# Patient Record
Sex: FEMALE | Race: WHITE | NOT HISPANIC OR LATINO | Employment: OTHER | URBAN - NONMETROPOLITAN AREA
[De-identification: names, ages, dates, MRNs, and addresses within clinical notes are randomized per-mention and may not be internally consistent; named-entity substitution may affect disease eponyms.]

---

## 2023-04-10 ENCOUNTER — TELEPHONE (OUTPATIENT)
Dept: PRIMARY CARE | Facility: CLINIC | Age: 88
End: 2023-04-10
Payer: MEDICARE

## 2023-04-10 DIAGNOSIS — I10 PRIMARY HYPERTENSION: ICD-10-CM

## 2023-04-10 DIAGNOSIS — M19.90 OSTEOARTHRITIS, UNSPECIFIED OSTEOARTHRITIS TYPE, UNSPECIFIED SITE: ICD-10-CM

## 2023-04-10 DIAGNOSIS — E03.9 ACQUIRED HYPOTHYROIDISM: ICD-10-CM

## 2023-04-10 DIAGNOSIS — E78.2 MIXED HYPERLIPIDEMIA: Primary | ICD-10-CM

## 2023-04-10 RX ORDER — ATORVASTATIN CALCIUM 20 MG/1
1 TABLET, FILM COATED ORAL DAILY
COMMUNITY
Start: 2020-02-25 | End: 2023-04-10 | Stop reason: SDUPTHER

## 2023-04-10 RX ORDER — MELOXICAM 7.5 MG/1
1 TABLET ORAL DAILY
COMMUNITY
Start: 2020-08-17 | End: 2023-04-10 | Stop reason: SDUPTHER

## 2023-04-10 RX ORDER — METOPROLOL SUCCINATE 50 MG/1
50 TABLET, EXTENDED RELEASE ORAL DAILY
Qty: 90 TABLET | Refills: 3 | Status: SHIPPED | OUTPATIENT
Start: 2023-04-10 | End: 2023-10-16 | Stop reason: SDUPTHER

## 2023-04-10 RX ORDER — ATORVASTATIN CALCIUM 20 MG/1
20 TABLET, FILM COATED ORAL DAILY
Qty: 90 TABLET | Refills: 3 | Status: SHIPPED | OUTPATIENT
Start: 2023-04-10 | End: 2024-02-28 | Stop reason: SDUPTHER

## 2023-04-10 RX ORDER — POTASSIUM CHLORIDE 750 MG/1
1 TABLET, FILM COATED, EXTENDED RELEASE ORAL 2 TIMES DAILY
COMMUNITY
Start: 2020-02-25 | End: 2023-04-10 | Stop reason: SDUPTHER

## 2023-04-10 RX ORDER — MELOXICAM 7.5 MG/1
7.5 TABLET ORAL DAILY
Qty: 90 TABLET | Refills: 3 | Status: SHIPPED | OUTPATIENT
Start: 2023-04-10 | End: 2024-02-28 | Stop reason: SDUPTHER

## 2023-04-10 RX ORDER — FELODIPINE 10 MG/1
1 TABLET, EXTENDED RELEASE ORAL DAILY
COMMUNITY
Start: 2020-02-26 | End: 2023-04-10 | Stop reason: SDUPTHER

## 2023-04-10 RX ORDER — METOPROLOL SUCCINATE 50 MG/1
1 TABLET, EXTENDED RELEASE ORAL DAILY
COMMUNITY
Start: 2020-02-25 | End: 2023-04-10 | Stop reason: SDUPTHER

## 2023-04-10 RX ORDER — LEVOTHYROXINE SODIUM 50 UG/1
1 TABLET ORAL DAILY
COMMUNITY
Start: 2020-02-25 | End: 2023-04-10 | Stop reason: SDUPTHER

## 2023-04-10 RX ORDER — FELODIPINE 10 MG/1
10 TABLET, EXTENDED RELEASE ORAL DAILY
Qty: 90 TABLET | Refills: 3 | Status: SHIPPED | OUTPATIENT
Start: 2023-04-10 | End: 2024-02-28 | Stop reason: SDUPTHER

## 2023-04-10 RX ORDER — LOSARTAN POTASSIUM AND HYDROCHLOROTHIAZIDE 12.5; 1 MG/1; MG/1
1 TABLET ORAL DAILY
COMMUNITY
Start: 2020-02-25 | End: 2023-04-10 | Stop reason: SDUPTHER

## 2023-04-10 RX ORDER — LOSARTAN POTASSIUM AND HYDROCHLOROTHIAZIDE 12.5; 1 MG/1; MG/1
1 TABLET ORAL DAILY
Qty: 90 TABLET | Refills: 3 | Status: SHIPPED | OUTPATIENT
Start: 2023-04-10 | End: 2024-02-28 | Stop reason: SDUPTHER

## 2023-04-10 RX ORDER — POTASSIUM CHLORIDE 750 MG/1
10 TABLET, FILM COATED, EXTENDED RELEASE ORAL 2 TIMES DAILY
Qty: 180 TABLET | Refills: 3 | Status: SHIPPED | OUTPATIENT
Start: 2023-04-10 | End: 2023-10-26 | Stop reason: SDUPTHER

## 2023-04-10 RX ORDER — LEVOTHYROXINE SODIUM 50 UG/1
50 TABLET ORAL DAILY
Qty: 90 TABLET | Refills: 3 | Status: SHIPPED | OUTPATIENT
Start: 2023-04-10 | End: 2024-02-28 | Stop reason: SDUPTHER

## 2023-04-11 LAB
ALANINE AMINOTRANSFERASE (SGPT) (U/L) IN SER/PLAS: 11 U/L (ref 7–45)
ALBUMIN (G/DL) IN SER/PLAS: 4.2 G/DL (ref 3.4–5)
ALKALINE PHOSPHATASE (U/L) IN SER/PLAS: 87 U/L (ref 33–136)
ANION GAP IN SER/PLAS: 11 MMOL/L (ref 10–20)
ASPARTATE AMINOTRANSFERASE (SGOT) (U/L) IN SER/PLAS: 19 U/L (ref 9–39)
BASOPHILS (10*3/UL) IN BLOOD BY AUTOMATED COUNT: 0.03 X10E9/L (ref 0–0.1)
BASOPHILS/100 LEUKOCYTES IN BLOOD BY AUTOMATED COUNT: 0.6 % (ref 0–2)
BILIRUBIN TOTAL (MG/DL) IN SER/PLAS: 0.8 MG/DL (ref 0–1.2)
CALCIUM (MG/DL) IN SER/PLAS: 9.5 MG/DL (ref 8.6–10.3)
CARBON DIOXIDE, TOTAL (MMOL/L) IN SER/PLAS: 28 MMOL/L (ref 21–32)
CHLORIDE (MMOL/L) IN SER/PLAS: 104 MMOL/L (ref 98–107)
CHOLESTEROL (MG/DL) IN SER/PLAS: 199 MG/DL (ref 0–199)
CHOLESTEROL IN HDL (MG/DL) IN SER/PLAS: 81 MG/DL
CHOLESTEROL/HDL RATIO: 2.5
COBALAMIN (VITAMIN B12) (PG/ML) IN SER/PLAS: 344 PG/ML (ref 211–911)
CREATININE (MG/DL) IN SER/PLAS: 1.34 MG/DL (ref 0.5–1.05)
EOSINOPHILS (10*3/UL) IN BLOOD BY AUTOMATED COUNT: 0.1 X10E9/L (ref 0–0.4)
EOSINOPHILS/100 LEUKOCYTES IN BLOOD BY AUTOMATED COUNT: 1.9 % (ref 0–6)
ERYTHROCYTE DISTRIBUTION WIDTH (RATIO) BY AUTOMATED COUNT: 13 % (ref 11.5–14.5)
ERYTHROCYTE MEAN CORPUSCULAR HEMOGLOBIN CONCENTRATION (G/DL) BY AUTOMATED: 31.6 G/DL (ref 32–36)
ERYTHROCYTE MEAN CORPUSCULAR VOLUME (FL) BY AUTOMATED COUNT: 97 FL (ref 80–100)
ERYTHROCYTES (10*6/UL) IN BLOOD BY AUTOMATED COUNT: 4.39 X10E12/L (ref 4–5.2)
GFR FEMALE: 36 ML/MIN/1.73M2
GLUCOSE (MG/DL) IN SER/PLAS: 89 MG/DL (ref 74–99)
HEMATOCRIT (%) IN BLOOD BY AUTOMATED COUNT: 42.4 % (ref 36–46)
HEMOGLOBIN (G/DL) IN BLOOD: 13.4 G/DL (ref 12–16)
IMMATURE GRANULOCYTES/100 LEUKOCYTES IN BLOOD BY AUTOMATED COUNT: 0.4 % (ref 0–0.9)
LDL: 94 MG/DL (ref 0–99)
LEUKOCYTES (10*3/UL) IN BLOOD BY AUTOMATED COUNT: 5.2 X10E9/L (ref 4.4–11.3)
LYMPHOCYTES (10*3/UL) IN BLOOD BY AUTOMATED COUNT: 2.47 X10E9/L (ref 0.8–3)
LYMPHOCYTES/100 LEUKOCYTES IN BLOOD BY AUTOMATED COUNT: 47.5 % (ref 13–44)
MONOCYTES (10*3/UL) IN BLOOD BY AUTOMATED COUNT: 0.49 X10E9/L (ref 0.05–0.8)
MONOCYTES/100 LEUKOCYTES IN BLOOD BY AUTOMATED COUNT: 9.4 % (ref 2–10)
NEUTROPHILS (10*3/UL) IN BLOOD BY AUTOMATED COUNT: 2.09 X10E9/L (ref 1.6–5.5)
NEUTROPHILS/100 LEUKOCYTES IN BLOOD BY AUTOMATED COUNT: 40.2 % (ref 40–80)
PLATELETS (10*3/UL) IN BLOOD AUTOMATED COUNT: 183 X10E9/L (ref 150–450)
POTASSIUM (MMOL/L) IN SER/PLAS: 3.9 MMOL/L (ref 3.5–5.3)
PROTEIN TOTAL: 6.3 G/DL (ref 6.4–8.2)
SODIUM (MMOL/L) IN SER/PLAS: 139 MMOL/L (ref 136–145)
THYROTROPIN (MIU/L) IN SER/PLAS BY DETECTION LIMIT <= 0.05 MIU/L: 3.67 MIU/L (ref 0.44–3.98)
TRIGLYCERIDE (MG/DL) IN SER/PLAS: 122 MG/DL (ref 0–149)
UREA NITROGEN (MG/DL) IN SER/PLAS: 29 MG/DL (ref 6–23)
VLDL: 24 MG/DL (ref 0–40)

## 2023-04-17 PROBLEM — R35.1 NOCTURIA: Status: ACTIVE | Noted: 2023-04-17

## 2023-04-17 PROBLEM — R20.0 NUMBNESS AND TINGLING IN LEFT HAND: Status: ACTIVE | Noted: 2023-04-17

## 2023-04-17 PROBLEM — L82.1 SEBORRHEIC KERATOSIS: Status: ACTIVE | Noted: 2023-04-17

## 2023-04-17 PROBLEM — N20.0 KIDNEY STONE: Status: ACTIVE | Noted: 2023-04-17

## 2023-04-17 PROBLEM — G56.00 CARPAL TUNNEL SYNDROME: Status: ACTIVE | Noted: 2023-04-17

## 2023-04-17 PROBLEM — N39.0 RECURRENT UTI: Status: ACTIVE | Noted: 2023-04-17

## 2023-04-17 PROBLEM — E78.5 HYPERLIPEMIA: Status: ACTIVE | Noted: 2023-04-17

## 2023-04-17 PROBLEM — R21 SKIN RASH: Status: ACTIVE | Noted: 2023-04-17

## 2023-04-17 PROBLEM — N18.30 CHRONIC RENAL IMPAIRMENT, STAGE 3 (MODERATE) (MULTI): Status: ACTIVE | Noted: 2023-04-17

## 2023-04-17 PROBLEM — K62.3 RECTAL PROLAPSE: Status: ACTIVE | Noted: 2023-04-17

## 2023-04-17 PROBLEM — M13.0 POLYARTHRITIS: Status: ACTIVE | Noted: 2023-04-17

## 2023-04-17 PROBLEM — R41.0 CONFUSION: Status: ACTIVE | Noted: 2023-04-17

## 2023-04-17 PROBLEM — K59.00 CONSTIPATION: Status: ACTIVE | Noted: 2023-04-17

## 2023-04-17 PROBLEM — E53.8 VITAMIN B12 DEFICIENCY: Status: ACTIVE | Noted: 2023-04-17

## 2023-04-17 PROBLEM — M54.2 NECK PAIN: Status: ACTIVE | Noted: 2023-04-17

## 2023-04-17 PROBLEM — M79.10 MYALGIA: Status: ACTIVE | Noted: 2023-04-17

## 2023-04-17 PROBLEM — I10 HYPERTENSION: Status: ACTIVE | Noted: 2023-04-17

## 2023-04-17 PROBLEM — E03.9 HYPOTHYROID: Status: ACTIVE | Noted: 2023-04-17

## 2023-04-17 PROBLEM — S60.00XA CONTUSION OF FINGER, LEFT: Status: ACTIVE | Noted: 2023-04-17

## 2023-04-17 PROBLEM — M79.645 PAIN OF LEFT MIDDLE FINGER: Status: ACTIVE | Noted: 2023-04-17

## 2023-04-17 PROBLEM — K64.4 HEMORRHOIDS, EXTERNAL: Status: ACTIVE | Noted: 2023-04-17

## 2023-04-17 PROBLEM — Z86.010 HISTORY OF COLON POLYPS: Status: ACTIVE | Noted: 2023-04-17

## 2023-04-17 PROBLEM — R53.83 FATIGUE: Status: ACTIVE | Noted: 2023-04-17

## 2023-04-17 PROBLEM — N39.3 SUI (STRESS URINARY INCONTINENCE, FEMALE): Status: ACTIVE | Noted: 2023-04-17

## 2023-04-17 PROBLEM — K21.9 GERD (GASTROESOPHAGEAL REFLUX DISEASE): Status: ACTIVE | Noted: 2023-04-17

## 2023-04-17 PROBLEM — R82.90 ABNORMAL URINE ODOR: Status: RESOLVED | Noted: 2023-04-17 | Resolved: 2023-04-17

## 2023-04-17 PROBLEM — R20.2 NUMBNESS AND TINGLING IN LEFT HAND: Status: ACTIVE | Noted: 2023-04-17

## 2023-04-17 PROBLEM — K58.9 IBS (IRRITABLE BOWEL SYNDROME): Status: ACTIVE | Noted: 2023-04-17

## 2023-04-17 PROBLEM — K62.5 RECTAL BLEEDING: Status: ACTIVE | Noted: 2023-04-17

## 2023-04-17 PROBLEM — R35.0 URINE FREQUENCY: Status: ACTIVE | Noted: 2023-04-17

## 2023-04-17 PROBLEM — F32.A DEPRESSION: Status: ACTIVE | Noted: 2023-04-17

## 2023-04-17 PROBLEM — Z86.0100 HISTORY OF COLON POLYPS: Status: ACTIVE | Noted: 2023-04-17

## 2023-04-17 PROBLEM — R30.0 DYSURIA: Status: RESOLVED | Noted: 2023-04-17 | Resolved: 2023-04-17

## 2023-04-17 RX ORDER — LUTEIN 6 MG
1 TABLET ORAL DAILY
COMMUNITY
Start: 2021-06-25

## 2023-04-17 RX ORDER — TALC
1 POWDER (GRAM) TOPICAL DAILY
COMMUNITY

## 2023-04-17 RX ORDER — FLUTICASONE PROPIONATE 50 MCG
1 SPRAY, SUSPENSION (ML) NASAL DAILY
COMMUNITY
Start: 2020-02-25 | End: 2024-02-28 | Stop reason: SDUPTHER

## 2023-04-17 RX ORDER — ACETAMINOPHEN 500 MG
1 TABLET ORAL DAILY
COMMUNITY
Start: 2021-06-25

## 2023-04-17 RX ORDER — NITROFURANTOIN 25; 75 MG/1; MG/1
1 CAPSULE ORAL 2 TIMES DAILY
COMMUNITY
Start: 2022-07-29 | End: 2023-04-18 | Stop reason: ALTCHOICE

## 2023-04-18 ENCOUNTER — OFFICE VISIT (OUTPATIENT)
Dept: PRIMARY CARE | Facility: CLINIC | Age: 88
End: 2023-04-18
Payer: MEDICARE

## 2023-04-18 VITALS
HEART RATE: 59 BPM | DIASTOLIC BLOOD PRESSURE: 52 MMHG | SYSTOLIC BLOOD PRESSURE: 140 MMHG | WEIGHT: 131 LBS | OXYGEN SATURATION: 97 % | BODY MASS INDEX: 26.41 KG/M2 | HEIGHT: 59 IN

## 2023-04-18 DIAGNOSIS — E78.2 MIXED HYPERLIPIDEMIA: ICD-10-CM

## 2023-04-18 DIAGNOSIS — N18.32 CHRONIC RENAL IMPAIRMENT, STAGE 3B (MULTI): ICD-10-CM

## 2023-04-18 DIAGNOSIS — I10 PRIMARY HYPERTENSION: Primary | ICD-10-CM

## 2023-04-18 DIAGNOSIS — E03.9 ACQUIRED HYPOTHYROIDISM: ICD-10-CM

## 2023-04-18 DIAGNOSIS — K58.9 IRRITABLE BOWEL SYNDROME, UNSPECIFIED TYPE: ICD-10-CM

## 2023-04-18 DIAGNOSIS — E53.8 VITAMIN B12 DEFICIENCY: ICD-10-CM

## 2023-04-18 DIAGNOSIS — K21.9 GASTROESOPHAGEAL REFLUX DISEASE WITHOUT ESOPHAGITIS: ICD-10-CM

## 2023-04-18 PROCEDURE — 1159F MED LIST DOCD IN RCRD: CPT | Performed by: FAMILY MEDICINE

## 2023-04-18 PROCEDURE — 3078F DIAST BP <80 MM HG: CPT | Performed by: FAMILY MEDICINE

## 2023-04-18 PROCEDURE — 1036F TOBACCO NON-USER: CPT | Performed by: FAMILY MEDICINE

## 2023-04-18 PROCEDURE — 99213 OFFICE O/P EST LOW 20 MIN: CPT | Performed by: FAMILY MEDICINE

## 2023-04-18 PROCEDURE — 3077F SYST BP >= 140 MM HG: CPT | Performed by: FAMILY MEDICINE

## 2023-04-18 PROCEDURE — 1160F RVW MEDS BY RX/DR IN RCRD: CPT | Performed by: FAMILY MEDICINE

## 2023-04-18 ASSESSMENT — ENCOUNTER SYMPTOMS
ABDOMINAL PAIN: 0
DIFFICULTY URINATING: 0
RHINORRHEA: 0
ABDOMINAL DISTENTION: 0
PALPITATIONS: 0
FATIGUE: 0
SHORTNESS OF BREATH: 0
UNEXPECTED WEIGHT CHANGE: 0
TROUBLE SWALLOWING: 0
VOMITING: 0
HEADACHES: 0
LIGHT-HEADEDNESS: 0
WHEEZING: 0
APPETITE CHANGE: 0
ARTHRALGIAS: 1
COUGH: 0
ADENOPATHY: 0
JOINT SWELLING: 1
DIARRHEA: 0
NUMBNESS: 0
NAUSEA: 0
CONSTIPATION: 1
DIZZINESS: 0
ACTIVITY CHANGE: 0

## 2023-04-18 NOTE — PROGRESS NOTES
"Subjective   Patient ID: Josi Mcneill is a 89 y.o. female who presents for 6 MO F/U HTN LABS.    HPI   No headache, chest pain, shortness of breath, dizziness (if lays back), lightheadedness, or edema  The patient is taking thyroid medications as directed without problems, labs done in the last year, no symptoms of excessive fatigue, edema or weight gain.  Having some pain in left arm and hand, went to ER, seen orthopedics, sent to OT, weakness with grabbing things at times  No falls, not sleeping well at night  Some issues with bowels  Some RN at times    Review of Systems   Constitutional:  Negative for activity change, appetite change, fatigue and unexpected weight change.   HENT:  Negative for ear pain, nosebleeds, rhinorrhea, sneezing and trouble swallowing.    Respiratory:  Negative for cough, shortness of breath and wheezing.    Cardiovascular:  Negative for chest pain, palpitations and leg swelling.   Gastrointestinal:  Positive for constipation. Negative for abdominal distention, abdominal pain, diarrhea, nausea and vomiting.   Genitourinary:  Negative for difficulty urinating.   Musculoskeletal:  Positive for arthralgias and joint swelling.   Skin:  Negative for rash.   Neurological:  Negative for dizziness, light-headedness, numbness and headaches.   Hematological:  Negative for adenopathy.   Psychiatric/Behavioral:  Negative for behavioral problems.    All other systems reviewed and are negative.      Objective   /52   Pulse 59   Ht 1.499 m (4' 11\")   Wt 59.4 kg (131 lb)   SpO2 97%   BMI 26.46 kg/m²     Physical Exam  Vitals and nursing note reviewed.   Constitutional:       Appearance: Normal appearance.   Cardiovascular:      Rate and Rhythm: Normal rate and regular rhythm.      Pulses: Normal pulses.      Heart sounds: Normal heart sounds.      Comments: +2 ankle swelling.  Pulmonary:      Effort: Pulmonary effort is normal.      Breath sounds: Normal breath sounds.   Neurological:    "   Mental Status: She is alert.   Psychiatric:         Mood and Affect: Mood normal.         Behavior: Behavior normal.         Assessment/Plan   Problem List Items Addressed This Visit          Circulatory    Hypertension - Primary     Blood pressure under good control, renal function stable, no change with medication.         Relevant Orders    Comprehensive Metabolic Panel    Follow Up In Primary Care       Digestive    GERD (gastroesophageal reflux disease)     Seems stable with current medication.         Relevant Orders    Follow Up In Primary Care    IBS (irritable bowel syndrome)     Advised about over-the-counter supplemental fiber.         Relevant Orders    Follow Up In Primary Care       Genitourinary    Chronic renal impairment, stage 3 (moderate) (CMS/HCC)     Creatinine is stable, electrolytes are stable, hemoglobin also stable, no change with medication.         Relevant Orders    CBC    Comprehensive Metabolic Panel    Follow Up In Primary Care       Endocrine/Metabolic    Hypothyroid     Thyroid testing stable, no change.         Relevant Orders    Follow Up In Primary Care    Vitamin B12 deficiency     Laboratory testing stable no change.         Relevant Orders    Follow Up In Primary Care       Other    Hyperlipemia    Relevant Orders    Cholesterol, LDL Direct    Follow Up In Primary Care

## 2023-07-13 ENCOUNTER — HOSPITAL ENCOUNTER (OUTPATIENT)
Dept: DATA CONVERSION | Facility: HOSPITAL | Age: 88
End: 2023-07-13
Attending: NURSE PRACTITIONER

## 2023-07-17 ENCOUNTER — OFFICE VISIT (OUTPATIENT)
Dept: PRIMARY CARE | Facility: CLINIC | Age: 88
End: 2023-07-17
Payer: MEDICARE

## 2023-07-17 VITALS
HEIGHT: 59 IN | HEART RATE: 60 BPM | DIASTOLIC BLOOD PRESSURE: 60 MMHG | OXYGEN SATURATION: 96 % | WEIGHT: 131.5 LBS | SYSTOLIC BLOOD PRESSURE: 160 MMHG | BODY MASS INDEX: 26.51 KG/M2

## 2023-07-17 DIAGNOSIS — R41.0 CONFUSION: ICD-10-CM

## 2023-07-17 DIAGNOSIS — I10 PRIMARY HYPERTENSION: Primary | ICD-10-CM

## 2023-07-17 DIAGNOSIS — M13.0 POLYARTHRITIS: ICD-10-CM

## 2023-07-17 DIAGNOSIS — N39.0 RECURRENT UTI: ICD-10-CM

## 2023-07-17 PROCEDURE — 3077F SYST BP >= 140 MM HG: CPT | Performed by: FAMILY MEDICINE

## 2023-07-17 PROCEDURE — 1159F MED LIST DOCD IN RCRD: CPT | Performed by: FAMILY MEDICINE

## 2023-07-17 PROCEDURE — 1036F TOBACCO NON-USER: CPT | Performed by: FAMILY MEDICINE

## 2023-07-17 PROCEDURE — 99213 OFFICE O/P EST LOW 20 MIN: CPT | Performed by: FAMILY MEDICINE

## 2023-07-17 PROCEDURE — 1160F RVW MEDS BY RX/DR IN RCRD: CPT | Performed by: FAMILY MEDICINE

## 2023-07-17 PROCEDURE — 3078F DIAST BP <80 MM HG: CPT | Performed by: FAMILY MEDICINE

## 2023-07-17 ASSESSMENT — ENCOUNTER SYMPTOMS
ADENOPATHY: 0
ABDOMINAL PAIN: 0
ACTIVITY CHANGE: 0
SHORTNESS OF BREATH: 0
COUGH: 0
UNEXPECTED WEIGHT CHANGE: 0
DIARRHEA: 0
CONSTIPATION: 0
WHEEZING: 0
FATIGUE: 0
HEADACHES: 0
VOMITING: 0
RHINORRHEA: 0
ABDOMINAL DISTENTION: 0
DIFFICULTY URINATING: 0
PALPITATIONS: 0
NAUSEA: 0
DIZZINESS: 0
ARTHRALGIAS: 0
TROUBLE SWALLOWING: 0
APPETITE CHANGE: 0
NUMBNESS: 0
LIGHT-HEADEDNESS: 0

## 2023-07-17 NOTE — ASSESSMENT & PLAN NOTE
Back pain seems to have improved, venous Doppler in the emergency room of the right leg was also negative, patient able to walk and ambulate without assistance at home.

## 2023-07-17 NOTE — ASSESSMENT & PLAN NOTE
Urine culture in the emergency room on the 13th was eventually negative, patient has no further new symptoms of urinary complaints, confusion has improved.

## 2023-07-17 NOTE — ASSESSMENT & PLAN NOTE
Blood pressure slightly elevated today in the office, advised to check home blood pressure readings, no change with medication, creatinine in the emergency room on the 13th was normal.

## 2023-07-17 NOTE — PROGRESS NOTES
"Subjective   Patient ID: Josi Mcneill is a 89 y.o. female who presents for ER F/U Back Pain,Confusion.    HPI   Was in ER 7/13 with confusion and back pain, labs OK, urine culture normal, venous duplex normal  No headache, chest pain, shortness of breath, dizziness, lightheadedness, or edema  Upset easily    Review of Systems   Constitutional:  Negative for activity change, appetite change, fatigue and unexpected weight change.   HENT:  Negative for ear pain, nosebleeds, rhinorrhea, sneezing and trouble swallowing.    Respiratory:  Negative for cough, shortness of breath and wheezing.    Cardiovascular:  Negative for chest pain, palpitations and leg swelling.   Gastrointestinal:  Negative for abdominal distention, abdominal pain, constipation, diarrhea, nausea and vomiting.   Genitourinary:  Negative for difficulty urinating.   Musculoskeletal:  Negative for arthralgias.   Skin:  Negative for rash.   Neurological:  Negative for dizziness, light-headedness, numbness and headaches.   Hematological:  Negative for adenopathy.   Psychiatric/Behavioral:  Negative for behavioral problems.    All other systems reviewed and are negative.      Objective   /60   Pulse 60   Ht 1.499 m (4' 11\")   Wt 59.6 kg (131 lb 8 oz)   SpO2 96%   BMI 26.56 kg/m²     Physical Exam  Vitals and nursing note reviewed.   Constitutional:       Appearance: Normal appearance.   HENT:      Head: Normocephalic and atraumatic.      Right Ear: Tympanic membrane, ear canal and external ear normal.      Left Ear: Tympanic membrane, ear canal and external ear normal.      Nose: Nose normal.      Mouth/Throat:      Mouth: Mucous membranes are moist.      Pharynx: Oropharynx is clear.   Cardiovascular:      Rate and Rhythm: Normal rate and regular rhythm.      Pulses: Normal pulses.      Heart sounds: Normal heart sounds.   Pulmonary:      Effort: Pulmonary effort is normal.      Breath sounds: Normal breath sounds.   Musculoskeletal:      " Cervical back: Normal range of motion and neck supple.   Neurological:      Mental Status: She is alert.   Psychiatric:         Mood and Affect: Mood normal.         Behavior: Behavior normal.         Assessment/Plan   Problem List Items Addressed This Visit       Confusion     Seems to have cleared for now.         Hypertension - Primary     Blood pressure slightly elevated today in the office, advised to check home blood pressure readings, no change with medication, creatinine in the emergency room on the 13th was normal.         Polyarthritis     Back pain seems to have improved, venous Doppler in the emergency room of the right leg was also negative, patient able to walk and ambulate without assistance at home.         Recurrent UTI     Urine culture in the emergency room on the 13th was eventually negative, patient has no further new symptoms of urinary complaints, confusion has improved.

## 2023-10-13 ENCOUNTER — TELEPHONE (OUTPATIENT)
Dept: PRIMARY CARE | Facility: CLINIC | Age: 88
End: 2023-10-13
Payer: MEDICARE

## 2023-10-13 NOTE — TELEPHONE ENCOUNTER
"DAUGHTER CALLED TO REPORT PATIENT IS \"OFF BALANCE\" NOT ASSOCIATED WITH POSITION CHANGES. DENIES ANY \"SPINNING\" SENSATION. SHE RELATES THIS TO THE BLOOD PRESSURE MEDICATION SHE IS ON. BP THIS MORNING /70. PLEASE ADVISE.    GERMAINE: [phone]    It is a new thing or his is been going on?  She has an appointment coming up later this month, if this is new and getting worse she probably needs to be seen.  Her blood pressure reading that they had is elevated, but not critical.  If this is new and getting worse she needs to be seen, if this is a chronic issue we can wait and see her at her normal appointment, I am reluctant to increase or adjust any medications until we have seen her.         Note         You routed conversation to Luis Batres MD4 hours ago (10:37 AM)     You4 hours ago (10:37 AM)       DAUGHTER CALLED TO REPORT PATIENT IS \"OFF BALANCE\" NOT ASSOCIATED WITH POSITION CHANGES. DENIES ANY \"SPINNING\" SENSATION. SHE RELATES THIS TO THE BLOOD PRESSURE MEDICATION SHE IS ON. BP THIS MORNING /70. PLEASE ADVISE.     GERMAINE: [phone]        SCHEDULED WITH W 10/16/2023  "

## 2023-10-13 NOTE — TELEPHONE ENCOUNTER
It is a new thing or his is been going on?  She has an appointment coming up later this month, if this is new and getting worse she probably needs to be seen.  Her blood pressure reading that they had is elevated, but not critical.  If this is new and getting worse she needs to be seen, if this is a chronic issue we can wait and see her at her normal appointment, I am reluctant to increase or adjust any medications until we have seen her.

## 2023-10-16 ENCOUNTER — OFFICE VISIT (OUTPATIENT)
Dept: PRIMARY CARE | Facility: CLINIC | Age: 88
End: 2023-10-16
Payer: MEDICARE

## 2023-10-16 VITALS
DIASTOLIC BLOOD PRESSURE: 56 MMHG | HEIGHT: 59 IN | OXYGEN SATURATION: 99 % | WEIGHT: 132.1 LBS | HEART RATE: 85 BPM | BODY MASS INDEX: 26.63 KG/M2 | SYSTOLIC BLOOD PRESSURE: 120 MMHG

## 2023-10-16 DIAGNOSIS — R42 DIZZINESS: Primary | ICD-10-CM

## 2023-10-16 DIAGNOSIS — E03.9 ACQUIRED HYPOTHYROIDISM: ICD-10-CM

## 2023-10-16 DIAGNOSIS — I10 PRIMARY HYPERTENSION: ICD-10-CM

## 2023-10-16 PROCEDURE — 1036F TOBACCO NON-USER: CPT | Performed by: NURSE PRACTITIONER

## 2023-10-16 PROCEDURE — 99213 OFFICE O/P EST LOW 20 MIN: CPT | Performed by: NURSE PRACTITIONER

## 2023-10-16 PROCEDURE — 3078F DIAST BP <80 MM HG: CPT | Performed by: NURSE PRACTITIONER

## 2023-10-16 PROCEDURE — 1160F RVW MEDS BY RX/DR IN RCRD: CPT | Performed by: NURSE PRACTITIONER

## 2023-10-16 PROCEDURE — 1159F MED LIST DOCD IN RCRD: CPT | Performed by: NURSE PRACTITIONER

## 2023-10-16 PROCEDURE — 3074F SYST BP LT 130 MM HG: CPT | Performed by: NURSE PRACTITIONER

## 2023-10-16 RX ORDER — METOPROLOL SUCCINATE 50 MG/1
50 TABLET, EXTENDED RELEASE ORAL DAILY
Qty: 90 TABLET | Refills: 3 | Status: SHIPPED | OUTPATIENT
Start: 2023-10-16 | End: 2024-10-15

## 2023-10-16 ASSESSMENT — ENCOUNTER SYMPTOMS
VOMITING: 0
DIZZINESS: 1
HEADACHES: 0
WEAKNESS: 1
PALPITATIONS: 0
SHORTNESS OF BREATH: 0
NAUSEA: 0
SPEECH DIFFICULTY: 0

## 2023-10-16 ASSESSMENT — PATIENT HEALTH QUESTIONNAIRE - PHQ9
SUM OF ALL RESPONSES TO PHQ9 QUESTIONS 1 AND 2: 0
1. LITTLE INTEREST OR PLEASURE IN DOING THINGS: NOT AT ALL
1. LITTLE INTEREST OR PLEASURE IN DOING THINGS: NOT AT ALL
2. FEELING DOWN, DEPRESSED OR HOPELESS: NOT AT ALL
2. FEELING DOWN, DEPRESSED OR HOPELESS: NOT AT ALL
SUM OF ALL RESPONSES TO PHQ9 QUESTIONS 1 AND 2: 0

## 2023-10-16 NOTE — PROGRESS NOTES
"Subjective   Patient ID: Josi Mcneill is a 89 y.o. female who presents for Dizziness (Off balance x couple weeks. ).    [place]ia comes to the office, with her daughter, to discuss dizziness.  States she has been feeling dizzy over the course of the last month.  This dizziness occurs when she gets up and moves around.  She has no prodromal symptoms prior to developing dizziness.  Dizziness occurring on a daily basis and is associated with movement.  Denies headache syncopal episodes chest pain shortness of breath or palpitations.  She does endorse some shakiness when she has these dizziness episodes.  Neck has been bothering her.  Not taking a lot of fluids  Fell on Friday lost balance trying to get dressed didn't hit head nor lost consciousness   Has noticed daughter states has noticed that her medication pill bottle is missing several medications.  IO blood pressure left arm sitting 149/58  IO blood pressure left arm standing 120/68         Review of Systems   HENT:  Positive for tinnitus.    Eyes:  Negative for visual disturbance.   Respiratory:  Negative for shortness of breath.    Cardiovascular:  Negative for chest pain and palpitations.   Gastrointestinal:  Negative for nausea and vomiting.   Neurological:  Positive for dizziness and weakness. Negative for syncope, speech difficulty and headaches.       Objective   /56   Pulse 85   Ht 1.499 m (4' 11\")   Wt 59.9 kg (132 lb 1.6 oz)   SpO2 99%   BMI 26.68 kg/m²     Physical Exam  Vitals and nursing note reviewed.   Constitutional:       Appearance: Normal appearance.   HENT:      Head: Normocephalic.   Neck:      Thyroid: No thyromegaly.      Vascular: No carotid bruit.   Cardiovascular:      Rate and Rhythm: Normal rate and regular rhythm.      Heart sounds: Normal heart sounds. No murmur heard.  Pulmonary:      Effort: Pulmonary effort is normal.      Breath sounds: Normal breath sounds.   Musculoskeletal:      Right lower le+ Edema " present.      Left lower le+ Edema present.   Skin:     General: Skin is warm and dry.   Neurological:      General: No focal deficit present.      Mental Status: She is alert and oriented to person, place, and time.   Psychiatric:         Mood and Affect: Mood normal.         Thought Content: Thought content normal.         Assessment/Plan   Problem List Items Addressed This Visit             ICD-10-CM    Hypertension I10    Relevant Medications    metoprolol succinate XL (Toprol-XL) 50 mg 24 hr tablet    Hypothyroid E03.9    Relevant Orders    Thyroid Stimulating Hormone    Dizziness - Primary R42

## 2023-10-18 ENCOUNTER — APPOINTMENT (OUTPATIENT)
Dept: PRIMARY CARE | Facility: CLINIC | Age: 88
End: 2023-10-18
Payer: MEDICARE

## 2023-10-20 ENCOUNTER — LAB (OUTPATIENT)
Dept: LAB | Facility: LAB | Age: 88
End: 2023-10-20
Payer: MEDICARE

## 2023-10-20 DIAGNOSIS — N18.32 CHRONIC RENAL IMPAIRMENT, STAGE 3B (MULTI): ICD-10-CM

## 2023-10-20 DIAGNOSIS — E78.2 MIXED HYPERLIPIDEMIA: ICD-10-CM

## 2023-10-20 DIAGNOSIS — E03.9 ACQUIRED HYPOTHYROIDISM: ICD-10-CM

## 2023-10-20 DIAGNOSIS — I10 PRIMARY HYPERTENSION: ICD-10-CM

## 2023-10-20 LAB
ALBUMIN SERPL BCP-MCNC: 4.2 G/DL (ref 3.4–5)
ALP SERPL-CCNC: 84 U/L (ref 33–136)
ALT SERPL W P-5'-P-CCNC: 12 U/L (ref 7–45)
ANION GAP SERPL CALC-SCNC: 9 MMOL/L (ref 10–20)
AST SERPL W P-5'-P-CCNC: 22 U/L (ref 9–39)
BILIRUB SERPL-MCNC: 0.7 MG/DL (ref 0–1.2)
BUN SERPL-MCNC: 26 MG/DL (ref 6–23)
CALCIUM SERPL-MCNC: 9.4 MG/DL (ref 8.6–10.3)
CHLORIDE SERPL-SCNC: 105 MMOL/L (ref 98–107)
CO2 SERPL-SCNC: 29 MMOL/L (ref 21–32)
CREAT SERPL-MCNC: 1.32 MG/DL (ref 0.5–1.05)
ERYTHROCYTE [DISTWIDTH] IN BLOOD BY AUTOMATED COUNT: 12.9 % (ref 11.5–14.5)
GFR SERPL CREATININE-BSD FRML MDRD: 36 ML/MIN/1.73M*2
GLUCOSE SERPL-MCNC: 95 MG/DL (ref 74–99)
HCT VFR BLD AUTO: 40.5 % (ref 36–46)
HGB BLD-MCNC: 12.9 G/DL (ref 12–16)
LDLC SERPL DIRECT ASSAY-MCNC: 78 MG/DL (ref 0–129)
MCH RBC QN AUTO: 31.1 PG (ref 26–34)
MCHC RBC AUTO-ENTMCNC: 31.9 G/DL (ref 32–36)
MCV RBC AUTO: 98 FL (ref 80–100)
NRBC BLD-RTO: 0 /100 WBCS (ref 0–0)
PLATELET # BLD AUTO: 181 X10*3/UL (ref 150–450)
PMV BLD AUTO: 11.3 FL (ref 7.5–11.5)
POTASSIUM SERPL-SCNC: 4.4 MMOL/L (ref 3.5–5.3)
PROT SERPL-MCNC: 6.4 G/DL (ref 6.4–8.2)
RBC # BLD AUTO: 4.15 X10*6/UL (ref 4–5.2)
SODIUM SERPL-SCNC: 139 MMOL/L (ref 136–145)
TSH SERPL-ACNC: 2.23 MIU/L (ref 0.44–3.98)
WBC # BLD AUTO: 5.3 X10*3/UL (ref 4.4–11.3)

## 2023-10-20 PROCEDURE — 85027 COMPLETE CBC AUTOMATED: CPT

## 2023-10-20 PROCEDURE — 83721 ASSAY OF BLOOD LIPOPROTEIN: CPT

## 2023-10-20 PROCEDURE — 36415 COLL VENOUS BLD VENIPUNCTURE: CPT

## 2023-10-20 PROCEDURE — 80053 COMPREHEN METABOLIC PANEL: CPT

## 2023-10-20 PROCEDURE — 84443 ASSAY THYROID STIM HORMONE: CPT

## 2023-10-26 ENCOUNTER — OFFICE VISIT (OUTPATIENT)
Dept: PRIMARY CARE | Facility: CLINIC | Age: 88
End: 2023-10-26
Payer: MEDICARE

## 2023-10-26 VITALS
DIASTOLIC BLOOD PRESSURE: 60 MMHG | WEIGHT: 131.9 LBS | OXYGEN SATURATION: 100 % | HEIGHT: 59 IN | BODY MASS INDEX: 26.59 KG/M2 | HEART RATE: 58 BPM | SYSTOLIC BLOOD PRESSURE: 120 MMHG

## 2023-10-26 DIAGNOSIS — I10 PRIMARY HYPERTENSION: Primary | ICD-10-CM

## 2023-10-26 DIAGNOSIS — K58.9 IRRITABLE BOWEL SYNDROME, UNSPECIFIED TYPE: ICD-10-CM

## 2023-10-26 DIAGNOSIS — E78.2 MIXED HYPERLIPIDEMIA: ICD-10-CM

## 2023-10-26 DIAGNOSIS — Z00.00 ROUTINE GENERAL MEDICAL EXAMINATION AT HEALTH CARE FACILITY: ICD-10-CM

## 2023-10-26 DIAGNOSIS — E03.9 ACQUIRED HYPOTHYROIDISM: ICD-10-CM

## 2023-10-26 DIAGNOSIS — N18.32 CHRONIC RENAL IMPAIRMENT, STAGE 3B (MULTI): ICD-10-CM

## 2023-10-26 DIAGNOSIS — R42 VERTIGO: ICD-10-CM

## 2023-10-26 DIAGNOSIS — E53.8 VITAMIN B12 DEFICIENCY: ICD-10-CM

## 2023-10-26 DIAGNOSIS — K21.9 GASTROESOPHAGEAL REFLUX DISEASE WITHOUT ESOPHAGITIS: ICD-10-CM

## 2023-10-26 PROBLEM — R35.0 URINE FREQUENCY: Status: RESOLVED | Noted: 2023-04-17 | Resolved: 2023-10-26

## 2023-10-26 PROBLEM — R21 SKIN RASH: Status: RESOLVED | Noted: 2023-04-17 | Resolved: 2023-10-26

## 2023-10-26 PROBLEM — M54.2 NECK PAIN: Status: RESOLVED | Noted: 2023-04-17 | Resolved: 2023-10-26

## 2023-10-26 PROBLEM — R41.0 CONFUSION: Status: RESOLVED | Noted: 2023-04-17 | Resolved: 2023-10-26

## 2023-10-26 PROBLEM — M79.645 PAIN OF LEFT MIDDLE FINGER: Status: RESOLVED | Noted: 2023-04-17 | Resolved: 2023-10-26

## 2023-10-26 PROBLEM — R20.2 NUMBNESS AND TINGLING IN LEFT HAND: Status: RESOLVED | Noted: 2023-04-17 | Resolved: 2023-10-26

## 2023-10-26 PROBLEM — R35.1 NOCTURIA: Status: RESOLVED | Noted: 2023-04-17 | Resolved: 2023-10-26

## 2023-10-26 PROBLEM — S60.00XA CONTUSION OF FINGER, LEFT: Status: RESOLVED | Noted: 2023-04-17 | Resolved: 2023-10-26

## 2023-10-26 PROBLEM — R20.0 NUMBNESS AND TINGLING IN LEFT HAND: Status: RESOLVED | Noted: 2023-04-17 | Resolved: 2023-10-26

## 2023-10-26 PROCEDURE — 1160F RVW MEDS BY RX/DR IN RCRD: CPT | Performed by: FAMILY MEDICINE

## 2023-10-26 PROCEDURE — 1036F TOBACCO NON-USER: CPT | Performed by: FAMILY MEDICINE

## 2023-10-26 PROCEDURE — 99214 OFFICE O/P EST MOD 30 MIN: CPT | Performed by: FAMILY MEDICINE

## 2023-10-26 PROCEDURE — 1170F FXNL STATUS ASSESSED: CPT | Performed by: FAMILY MEDICINE

## 2023-10-26 PROCEDURE — 1159F MED LIST DOCD IN RCRD: CPT | Performed by: FAMILY MEDICINE

## 2023-10-26 PROCEDURE — 3074F SYST BP LT 130 MM HG: CPT | Performed by: FAMILY MEDICINE

## 2023-10-26 PROCEDURE — G0439 PPPS, SUBSEQ VISIT: HCPCS | Performed by: FAMILY MEDICINE

## 2023-10-26 PROCEDURE — 3078F DIAST BP <80 MM HG: CPT | Performed by: FAMILY MEDICINE

## 2023-10-26 RX ORDER — POTASSIUM CHLORIDE 750 MG/1
10 TABLET, FILM COATED, EXTENDED RELEASE ORAL 2 TIMES DAILY
Qty: 180 TABLET | Refills: 3 | Status: SHIPPED | OUTPATIENT
Start: 2023-10-26 | End: 2024-10-25

## 2023-10-26 ASSESSMENT — ENCOUNTER SYMPTOMS
LOSS OF SENSATION IN FEET: 0
DEPRESSION: 0
LIGHT-HEADEDNESS: 0
UNEXPECTED WEIGHT CHANGE: 0
FATIGUE: 0
NUMBNESS: 0
ADENOPATHY: 0
RHINORRHEA: 0
ABDOMINAL PAIN: 0
DIZZINESS: 1
DIARRHEA: 0
ARTHRALGIAS: 0
VOMITING: 0
TROUBLE SWALLOWING: 0
APPETITE CHANGE: 0
CONSTIPATION: 0
ACTIVITY CHANGE: 0
ABDOMINAL DISTENTION: 0
DIFFICULTY URINATING: 0
HEADACHES: 0
SHORTNESS OF BREATH: 0
WHEEZING: 0
PALPITATIONS: 0
COUGH: 0
NAUSEA: 0
OCCASIONAL FEELINGS OF UNSTEADINESS: 1

## 2023-10-26 ASSESSMENT — ACTIVITIES OF DAILY LIVING (ADL)
BATHING: INDEPENDENT
MANAGING_FINANCES: NEEDS ASSISTANCE
GROCERY_SHOPPING: INDEPENDENT
TAKING_MEDICATION: INDEPENDENT
DOING_HOUSEWORK: INDEPENDENT
DRESSING: INDEPENDENT

## 2023-10-26 ASSESSMENT — PATIENT HEALTH QUESTIONNAIRE - PHQ9
2. FEELING DOWN, DEPRESSED OR HOPELESS: NOT AT ALL
1. LITTLE INTEREST OR PLEASURE IN DOING THINGS: NOT AT ALL
SUM OF ALL RESPONSES TO PHQ9 QUESTIONS 1 AND 2: 0

## 2023-10-26 NOTE — PROGRESS NOTES
"Subjective   Reason for Visit: Josi Mcneill is an 89 y.o. female here for a Medicare Wellness visit.     Past Medical, Surgical, and Family History reviewed and updated in chart.    Reviewed all medications by prescribing practitioner or clinical pharmacist (such as prescriptions, OTCs, herbal therapies and supplements) and documented in the medical record.    HPI  No headache, chest pain, shortness of breath, dizziness (when lays down), lightheadedness, or edema  The patient is taking thyroid medications as directed without problems, labs done in the last year, no symptoms of excessive fatigue, edema or weight gain.  Vertigo at night when lays down, had a fall in the past month, for the past 2 weeks  HBP less than 140/90, no nausea, bowels moving  No issues with joint pain, some trouble with left ear    Patient Care Team:  Luis Batres MD as PCP - General  Luis Batres MD as PCP - [employer] [facility] Attributed Provider  Luis Batres MD as PCP - [employer] PCP     Review of Systems   Constitutional:  Negative for activity change, appetite change, fatigue and unexpected weight change.   HENT:  Negative for ear pain, nosebleeds, rhinorrhea, sneezing and trouble swallowing.    Respiratory:  Negative for cough, shortness of breath and wheezing.    Cardiovascular:  Negative for chest pain, palpitations and leg swelling.   Gastrointestinal:  Negative for abdominal distention, abdominal pain, constipation, diarrhea, nausea and vomiting.   Genitourinary:  Negative for difficulty urinating.   Musculoskeletal:  Negative for arthralgias.   Skin:  Negative for rash.   Neurological:  Positive for dizziness. Negative for light-headedness, numbness and headaches.   Hematological:  Negative for adenopathy.   Psychiatric/Behavioral:  Negative for behavioral problems.    All other systems reviewed and are negative.      Objective   Vitals:  /60   Pulse 58   Ht 1.499 m (4' 11\")   Wt 59.8 kg (131 lb 14.4 oz)   " SpO2 100%   BMI 26.64 kg/m²       Physical Exam  Vitals and nursing note reviewed.   Constitutional:       Appearance: Normal appearance.   HENT:      Head: Normocephalic and atraumatic.      Right Ear: Tympanic membrane, ear canal and external ear normal.      Left Ear: Tympanic membrane, ear canal and external ear normal.      Nose: Nose normal.      Mouth/Throat:      Mouth: Mucous membranes are moist.      Pharynx: Oropharynx is clear.   Cardiovascular:      Rate and Rhythm: Normal rate and regular rhythm.      Pulses: Normal pulses.      Heart sounds: Normal heart sounds.   Pulmonary:      Effort: Pulmonary effort is normal.      Breath sounds: Normal breath sounds.   Musculoskeletal:      Cervical back: Normal range of motion and neck supple.   Neurological:      Mental Status: She is alert.      Comments: Positive Edie-Hallpike maneuver to the left.   Psychiatric:         Mood and Affect: Mood normal.         Behavior: Behavior normal.         Assessment/Plan   Problem List Items Addressed This Visit       Chronic renal impairment, stage 3 (moderate) (CMS/HCC)    Current Assessment & Plan     Renal function stable, potassium and bicarbonate stable, no change.         Relevant Medications    potassium chloride CR 10 mEq ER tablet    Other Relevant Orders    Follow Up In Primary Care - Established    CBC and Auto Differential    Comprehensive Metabolic Panel    GERD (gastroesophageal reflux disease)    Current Assessment & Plan     No complaints with dysphagia.         Relevant Orders    Follow Up In Primary Care - Established    Hyperlipemia    Current Assessment & Plan     Labs stable no change.         Relevant Orders    Follow Up In Primary Care - Established    Comprehensive Metabolic Panel    Lipid Panel    Hypertension - Primary    Current Assessment & Plan     Blood pressure stable today, home blood pressure readings below 140/90, tolerating medication, stable renal function.         Relevant  Medications    potassium chloride CR 10 mEq ER tablet    Other Relevant Orders    Follow Up In Primary Care - Established    Comprehensive Metabolic Panel    Hypothyroid    Current Assessment & Plan     TSH therapeutic, no change.         Relevant Orders    Follow Up In Primary Care - Established    IBS (irritable bowel syndrome)    Relevant Orders    Follow Up In Primary Care - Established    Vitamin B12 deficiency    Current Assessment & Plan     Neck level at follow-up.         Relevant Orders    Follow Up In Primary Care - Established    Vitamin B12     Other Visit Diagnoses       Routine general medical examination at health care facility        Relevant Orders    Follow Up In Primary Care - Established    Vertigo        Refer to PT for vestibular training.    Relevant Orders    Follow Up In Primary Care - Established    Referral to Physical Therapy             Patient was identified as a fall risk. Risk prevention instructions provided.

## 2023-10-26 NOTE — ASSESSMENT & PLAN NOTE
Blood pressure stable today, home blood pressure readings below 140/90, tolerating medication, stable renal function.

## 2023-11-09 ENCOUNTER — EVALUATION (OUTPATIENT)
Dept: PHYSICAL THERAPY | Facility: CLINIC | Age: 88
End: 2023-11-09
Payer: MEDICARE

## 2023-11-09 DIAGNOSIS — R42 VERTIGO: ICD-10-CM

## 2023-11-09 PROCEDURE — 97112 NEUROMUSCULAR REEDUCATION: CPT | Mod: GP

## 2023-11-09 PROCEDURE — 97161 PT EVAL LOW COMPLEX 20 MIN: CPT | Mod: GP

## 2023-11-09 ASSESSMENT — PAIN - FUNCTIONAL ASSESSMENT: PAIN_FUNCTIONAL_ASSESSMENT: 0-10

## 2023-11-09 ASSESSMENT — ENCOUNTER SYMPTOMS
OCCASIONAL FEELINGS OF UNSTEADINESS: 0
LOSS OF SENSATION IN FEET: 0
DEPRESSION: 0

## 2023-11-09 ASSESSMENT — PAIN SCALES - GENERAL: PAINLEVEL_OUTOF10: 0 - NO PAIN

## 2023-11-09 NOTE — LETTER
November 9, 2023     Patient: Josi Mcneill   YOB: 1924   Date of Visit: 11/9/2023       To Whom it May Concern:    Josi Mcneill was seen in my clinic on 11/9/2023. She {Return to school/sport:96701}.    If you have any questions or concerns, please don't hesitate to call.         Sincerely,          Sadaf Holm, PT        CC: No Recipients

## 2023-11-09 NOTE — LETTER
November 9, 2023    Sadaf Holm PT  [address] [facility]  Rehab Services  [facility] [address]    Patient: Josi Mcneill   YOB: 1924   Date of Visit: 11/9/2023       Dear Luis Batres Md  [address] [place] [address]    The attached plan of care is being sent to you because your patient’s medical reimbursement requires that you certify the plan of care. Your signature is required to allow uninterrupted insurance coverage.      You may indicate your approval by signing below and faxing this form back to us at Dept Fax: [phone].    Please call Dept: [phone] with any questions or concerns.    Thank you for this referral,        Sadaf Holm PT  [facility] [address] [address] [facility] [address]    Payer: Payor: MEDICARE / Plan: MEDICARE PART A AND B / Product Type: *No Product type* /                                                                         Date:     Dear Sadaf Holm PT,     Re: Ms. Josi Mcneill, MRN:[id number]    I certify that I have reviewed the attached plan of care and it is medically necessary for Ms. Josi Mcneill (09/19/1924) who is under my care.          ______________________________________                    _________________  Provider name and credentials                                           Date and time                                                                                           Plan of Care 11/9/23   Effective from: 11/9/2023  Effective to: 12/9/2023    Plan ID: [id number]            Participants as of Finalize on 11/9/2023    Name Type Comments Contact Info    Luis Batres MD PCP - General  [phone]    Sadaf Holm PT Physical Therapist  [phone]       Last Plan Note     Author: Sadaf Holm PT Status: Incomplete Last edited: 11/9/2023  1:15 PM       Physical Therapy Evaluation and Treatment      Patient Name: Josi Mcneill  MRN: [id number]  Today's Date:  2023         Assessment:  PT Assessment Results: Decreased mobility, dizziness  Rehab Prognosis: Good  Evaluation/Treatment Tolerance:  (Tolerated well however dizziness/ imbalance increased at end)  Strengths: Capable of completing ADLs semi/independent, Insight into problems, Physical health, Support of Caregivers  Barriers to Participation:  (general mobility for completing manuevers)    Ms. Mcneill arrives to outpatient PT with s/s consistent with c/o R posterior canal BPPV which most affects her while lying in bed every night. She does demo imbalance after completing neuro re-ed. Educated on Alfonso daroff to R only and how to safely perform at home. Perform HEP 1-2x/day, 3x reps maximum. Recheck in 1-2 weeks which pt instructed to cancel if dizziness subsides by then. If she does not return within 30 days from today she will be discharged per attendance policy.       Plan:  Alfonso daroff maneuver to R. Check other canals again.     Treatment/Interventions: Canalith repositioning, Education/ Instruction, Self care/ home management, Neuromuscular re-education, Therapeutic exercises, Therapeutic activities  PT Plan: Skilled PT  PT Frequency: Follow-up visit only  Duration: in 1-2 weeks  Rehab Potential: Good  Plan of Care Agreement: Patient    Current Problem:   Problem List Items Addressed This Visit    None  Visit Diagnoses         Codes    Vertigo     R42    Refer to PT for vestibular training.     Relevant Orders    Follow Up In Physical Therapy              Subjective   General: General  Reason for Referral: dizzy  Referred By: maria esther  Past Medical History Relevant to Rehab: HTN, OA  Family/Caregiver Present: Yes (daughter)  Pt identified by name and .  Dizziness with lying down at night. Dizziness described as room spinning. No dizziness otherwise. HTN is well medicated. Has been going on for a while now. No changes in diet or activity level. No changes in hearing or ringing in ears.      Precautions:  Precautions  STEADI Fall Risk Score (The score of 4 or more indicates an increased risk of falling): 1  Precautions Comment: low fall risk    Pain:  Pain Assessment  Pain Assessment: 0-10  Pain Score: 0 - No pain    Prior Level of Function:  Prior Function Per Pt/Caregiver Report  Level of Wake: Independent with ADLs and functional transfers, Independent with homemaking with ambulation    Objective  Cervical AROM  WFL all    Significant fwd flexed head, slumped shoulders    TTP: B UT, SCM  Tightness: severe B UT, LS, MT, SCM, cervical paraspinals    Saccades-      Vertical: intact      Horizontal: intact  Smooth pursuit (H test): intact    Head shake test (VOR): intact    Vertebral artery test: negative    Edie hallpike: +R with s/s and nystagmus observed   Horizontal roll test: + R from s/s, no nystagmus    DHI: 10  /100    Outcome Measures:  Other Measures  Dizziness Handicap Inventory: 10/100     Treatments:  Balance/Neuromuscular Re-Education  Balance/Neuromuscular Re-Education Activity Performed: Yes  Epley R x1  Kaden AVILES x2    OP EDUCATION:  Education  Individual(s) Educated: Patient, Child  Education Provided: Anatomy, Home Exercise Program, Physiology, POC  Patient/Caregiver Demonstrated Understanding: yes  Plan of Care Discussed and Agreed Upon: yes  Patient Response to Education: Patient/Caregiver Verbalized Understanding of Information, Patient/Caregiver Performed Return Demonstration of Exercises/Activities    HEP HO given  Kaden AVILES    Goals:  Active       Mobility       Goal 1       Start:  11/09/23    Expected End:  12/24/23       In 2 weeks, pt will report 50% or more reduction of dizziness s/s for going to bed.          Goal 2       Start:  11/09/23    Expected End:  12/24/23       In 2 weeks, pt will be IND and compliant with HEP for participation throughout POC.                      Current Participants as of 11/9/2023    Name Type Comments Contact Info     Luis Batres MD PCP - General  [phone]    Signature pending    Sadaf Holm PT Physical Therapist  [phone]    Signature pending

## 2023-11-09 NOTE — LETTER
November 9, 2023     Patient: Josi Mcneill   YOB: 1924   Date of Visit: 11/9/2023       To Whom It May Concern:    It is my medical opinion that Josi Mcneill {Work release (duty restriction):18551}.    If you have any questions or concerns, please don't hesitate to call.         Sincerely,        Sadaf Holm, PT    CC: No Recipients

## 2023-11-09 NOTE — PROGRESS NOTES
Physical Therapy Evaluation and Treatment      Patient Name: Josi Mcneill  MRN: [id number]  Today's Date: 2023  Time Calculation  Start Time: 1316  Stop Time: 1350  Time Calculation (min): 34 min      Assessment:  PT Assessment Results: Decreased mobility, dizziness  Rehab Prognosis: Good  Evaluation/Treatment Tolerance:  (Tolerated well however dizziness/ imbalance increased at end)  Strengths: Capable of completing ADLs semi/independent, Insight into problems, Physical health, Support of Caregivers  Barriers to Participation:  (general mobility for completing manuevers)    Ms. Mcneill arrives to outpatient PT with s/s consistent with c/o R posterior canal BPPV which most affects her while lying in bed every night. She does demo imbalance after completing neuro re-ed. Educated on Alfonso daroff to R only and how to safely perform at home. Perform HEP 1-2x/day, 3x reps maximum. Recheck in 1-2 weeks which pt instructed to cancel if dizziness subsides by then. If she does not return within 30 days from today she will be discharged per attendance policy.       Plan:  Alfonso daroff maneuver to R. Check other canals again.     Treatment/Interventions: Canalith repositioning, Education/ Instruction, Self care/ home management, Neuromuscular re-education, Therapeutic exercises, Therapeutic activities  PT Plan: Skilled PT  PT Frequency: Follow-up visit only  Duration: in 1-2 weeks  Rehab Potential: Good  Plan of Care Agreement: Patient    Current Problem:   Problem List Items Addressed This Visit    None  Visit Diagnoses         Codes    Vertigo     R42    Refer to PT for vestibular training.     Relevant Orders    Follow Up In Physical Therapy              Subjective    General: General  Reason for Referral: dizzy  Referred By: maria esther  Past Medical History Relevant to Rehab: HTN, OA  Family/Caregiver Present: Yes (daughter)  Pt identified by name and .  Dizziness with lying down at night. Dizziness described as  room spinning. No dizziness otherwise. HTN is well medicated. Has been going on for a while now. No changes in diet or activity level. No changes in hearing or ringing in ears.     Precautions:  Precautions  STEADI Fall Risk Score (The score of 4 or more indicates an increased risk of falling): 1  Precautions Comment: low fall risk    Pain:  Pain Assessment  Pain Assessment: 0-10  Pain Score: 0 - No pain    Prior Level of Function:  Prior Function Per Pt/Caregiver Report  Level of [place]: Independent with ADLs and functional transfers, Independent with homemaking with ambulation    Objective   Cervical AROM  WFL all    Significant fwd flexed head, slumped shoulders    TTP: B UT, SCM  Tightness: severe B UT, LS, MT, SCM, cervical paraspinals    Saccades-      Vertical: intact      Horizontal: intact  Smooth pursuit (H test): intact    Head shake test (VOR): intact    Vertebral artery test: negative    [place] hallpike: +R with s/s and nystagmus observed   Horizontal roll test: + R from s/s, no nystagmus    DHI: 10  /100    Outcome Measures:  Other Measures  Dizziness Handicap Inventory: 10/100     Treatments:  Balance/Neuromuscular Re-Education  Balance/Neuromuscular Re-Education Activity Performed: Yes  Epley R x1  Kaden AVILES x2    OP EDUCATION:  Education  Individual(s) Educated: Patient, Child  Education Provided: Anatomy, Home Exercise Program, Physiology, POC  Patient/Caregiver Demonstrated Understanding: yes  Plan of Care Discussed and Agreed Upon: yes  Patient Response to Education: Patient/Caregiver Verbalized Understanding of Information, Patient/Caregiver Performed Return Demonstration of Exercises/Activities    HEP HO given  Kaden AVILES    Goals:  Active       Mobility       Goal 1       Start:  11/09/23    Expected End:  12/24/23       In 2 weeks, pt will report 50% or more reduction of dizziness s/s for going to bed.          Goal 2       Start:  11/09/23    Expected End:  12/24/23       In 2  weeks, pt will be IND and compliant with HEP for participation throughout POC.

## 2023-11-21 ENCOUNTER — DOCUMENTATION (OUTPATIENT)
Dept: PHYSICAL THERAPY | Facility: CLINIC | Age: 88
End: 2023-11-21
Payer: MEDICARE

## 2023-11-21 ENCOUNTER — APPOINTMENT (OUTPATIENT)
Dept: PHYSICAL THERAPY | Facility: CLINIC | Age: 88
End: 2023-11-21
Payer: MEDICARE

## 2023-11-21 NOTE — PROGRESS NOTES
Physical Therapy    Discharge Summary    Name: Josi Mcneill  MRN: [id number]  : 1924  Date: 23    Discharge Summary: PT    Discharge Information: Date of discharge 23    Pt cancelled recheck today as she is feeling better and doing well with HEP therefore PT services not required today. At this time she will be discharged from PT services.

## 2024-01-18 ENCOUNTER — APPOINTMENT (OUTPATIENT)
Dept: PRIMARY CARE | Facility: CLINIC | Age: 89
End: 2024-01-18
Payer: MEDICARE

## 2024-01-25 ENCOUNTER — OFFICE VISIT (OUTPATIENT)
Dept: PRIMARY CARE | Facility: CLINIC | Age: 89
End: 2024-01-25
Payer: MEDICARE

## 2024-01-25 VITALS
HEART RATE: 69 BPM | HEIGHT: 59 IN | DIASTOLIC BLOOD PRESSURE: 60 MMHG | SYSTOLIC BLOOD PRESSURE: 140 MMHG | BODY MASS INDEX: 26.03 KG/M2 | OXYGEN SATURATION: 96 % | WEIGHT: 129.1 LBS

## 2024-01-25 DIAGNOSIS — N18.32 CHRONIC RENAL IMPAIRMENT, STAGE 3B (MULTI): ICD-10-CM

## 2024-01-25 DIAGNOSIS — I10 PRIMARY HYPERTENSION: ICD-10-CM

## 2024-01-25 DIAGNOSIS — F03.B0 MODERATE DEMENTIA, UNSPECIFIED DEMENTIA TYPE, UNSPECIFIED WHETHER BEHAVIORAL, PSYCHOTIC, OR MOOD DISTURBANCE OR ANXIETY (MULTI): Primary | ICD-10-CM

## 2024-01-25 DIAGNOSIS — E03.9 ACQUIRED HYPOTHYROIDISM: ICD-10-CM

## 2024-01-25 DIAGNOSIS — R41.0 CONFUSION: ICD-10-CM

## 2024-01-25 LAB
ALBUMIN SERPL BCP-MCNC: 4.5 G/DL (ref 3.4–5)
ALP SERPL-CCNC: 81 U/L (ref 33–136)
ALT SERPL W P-5'-P-CCNC: 11 U/L (ref 7–45)
ANION GAP SERPL CALC-SCNC: 12 MMOL/L (ref 10–20)
AST SERPL W P-5'-P-CCNC: 20 U/L (ref 9–39)
BASOPHILS # BLD AUTO: 0.03 X10*3/UL (ref 0–0.1)
BASOPHILS NFR BLD AUTO: 0.4 %
BILIRUB SERPL-MCNC: 0.6 MG/DL (ref 0–1.2)
BUN SERPL-MCNC: 38 MG/DL (ref 6–23)
CALCIUM SERPL-MCNC: 9.3 MG/DL (ref 8.6–10.3)
CHLORIDE SERPL-SCNC: 104 MMOL/L (ref 98–107)
CO2 SERPL-SCNC: 26 MMOL/L (ref 21–32)
CREAT SERPL-MCNC: 1.56 MG/DL (ref 0.5–1.05)
EGFRCR SERPLBLD CKD-EPI 2021: 30 ML/MIN/1.73M*2
EOSINOPHIL # BLD AUTO: 0.06 X10*3/UL (ref 0–0.4)
EOSINOPHIL NFR BLD AUTO: 0.9 %
ERYTHROCYTE [DISTWIDTH] IN BLOOD BY AUTOMATED COUNT: 13.2 % (ref 11.5–14.5)
GLUCOSE SERPL-MCNC: 113 MG/DL (ref 74–99)
HCT VFR BLD AUTO: 40.7 % (ref 36–46)
HGB BLD-MCNC: 13.2 G/DL (ref 12–16)
IMM GRANULOCYTES # BLD AUTO: 0.02 X10*3/UL (ref 0–0.5)
IMM GRANULOCYTES NFR BLD AUTO: 0.3 % (ref 0–0.9)
LYMPHOCYTES # BLD AUTO: 2.01 X10*3/UL (ref 0.8–3)
LYMPHOCYTES NFR BLD AUTO: 30 %
MCH RBC QN AUTO: 31.6 PG (ref 26–34)
MCHC RBC AUTO-ENTMCNC: 32.4 G/DL (ref 32–36)
MCV RBC AUTO: 97 FL (ref 80–100)
MONOCYTES # BLD AUTO: 0.56 X10*3/UL (ref 0.05–0.8)
MONOCYTES NFR BLD AUTO: 8.3 %
NEUTROPHILS # BLD AUTO: 4.03 X10*3/UL (ref 1.6–5.5)
NEUTROPHILS NFR BLD AUTO: 60.1 %
NRBC BLD-RTO: 0 /100 WBCS (ref 0–0)
PLATELET # BLD AUTO: 157 X10*3/UL (ref 150–450)
POC APPEARANCE, URINE: CLEAR
POC BILIRUBIN, URINE: NEGATIVE
POC BLOOD, URINE: NEGATIVE
POC COLOR, URINE: YELLOW
POC GLUCOSE, URINE: NEGATIVE MG/DL
POC KETONES, URINE: NEGATIVE MG/DL
POC LEUKOCYTES, URINE: ABNORMAL
POC NITRITE,URINE: NEGATIVE
POC PH, URINE: 6.5 PH
POC PROTEIN, URINE: NEGATIVE MG/DL
POC SPECIFIC GRAVITY, URINE: 1.01
POC UROBILINOGEN, URINE: 0.2 EU/DL
POTASSIUM SERPL-SCNC: 4.1 MMOL/L (ref 3.5–5.3)
PROT SERPL-MCNC: 6.6 G/DL (ref 6.4–8.2)
RBC # BLD AUTO: 4.18 X10*6/UL (ref 4–5.2)
SODIUM SERPL-SCNC: 138 MMOL/L (ref 136–145)
TSH SERPL-ACNC: 1.03 MIU/L (ref 0.44–3.98)
VIT B12 SERPL-MCNC: 436 PG/ML (ref 211–911)
WBC # BLD AUTO: 6.7 X10*3/UL (ref 4.4–11.3)

## 2024-01-25 PROCEDURE — 81002 URINALYSIS NONAUTO W/O SCOPE: CPT | Performed by: FAMILY MEDICINE

## 2024-01-25 PROCEDURE — 1126F AMNT PAIN NOTED NONE PRSNT: CPT | Performed by: FAMILY MEDICINE

## 2024-01-25 PROCEDURE — 85025 COMPLETE CBC W/AUTO DIFF WBC: CPT

## 2024-01-25 PROCEDURE — 1036F TOBACCO NON-USER: CPT | Performed by: FAMILY MEDICINE

## 2024-01-25 PROCEDURE — 82607 VITAMIN B-12: CPT

## 2024-01-25 PROCEDURE — 1159F MED LIST DOCD IN RCRD: CPT | Performed by: FAMILY MEDICINE

## 2024-01-25 PROCEDURE — 3077F SYST BP >= 140 MM HG: CPT | Performed by: FAMILY MEDICINE

## 2024-01-25 PROCEDURE — 99214 OFFICE O/P EST MOD 30 MIN: CPT | Performed by: FAMILY MEDICINE

## 2024-01-25 PROCEDURE — 3078F DIAST BP <80 MM HG: CPT | Performed by: FAMILY MEDICINE

## 2024-01-25 PROCEDURE — 36415 COLL VENOUS BLD VENIPUNCTURE: CPT

## 2024-01-25 PROCEDURE — 1160F RVW MEDS BY RX/DR IN RCRD: CPT | Performed by: FAMILY MEDICINE

## 2024-01-25 PROCEDURE — 84443 ASSAY THYROID STIM HORMONE: CPT

## 2024-01-25 PROCEDURE — 80053 COMPREHEN METABOLIC PANEL: CPT

## 2024-01-25 ASSESSMENT — ENCOUNTER SYMPTOMS
FATIGUE: 0
VOMITING: 0
DYSPHORIC MOOD: 0
DIZZINESS: 0
RHINORRHEA: 0
CONSTIPATION: 0
LIGHT-HEADEDNESS: 0
TROUBLE SWALLOWING: 0
WHEEZING: 0
ABDOMINAL PAIN: 0
ABDOMINAL DISTENTION: 0
CONFUSION: 1
SLEEP DISTURBANCE: 0
APPETITE CHANGE: 0
ACTIVITY CHANGE: 0
COUGH: 0
NERVOUS/ANXIOUS: 0
HEADACHES: 0
PALPITATIONS: 0
ARTHRALGIAS: 0
NUMBNESS: 0
DECREASED CONCENTRATION: 1
DIARRHEA: 0
AGITATION: 0
SHORTNESS OF BREATH: 0
ADENOPATHY: 0
UNEXPECTED WEIGHT CHANGE: 0
NAUSEA: 0
DIFFICULTY URINATING: 0

## 2024-01-25 NOTE — PROGRESS NOTES
"Subjective   Patient ID: Josi Mcneill is a 89 y.o. female who presents for memory issues.    HPI   Some change in urination, anxious, neck pain, more nervous, some forgetfulness, off and on  No change in the past 2 weeks, comes and goes  Lives alone, family helps, able to do own ADLs, family manages finances (oversees)  Uses Tylenol PM at night  Restlessness comes and goes  No falls in the past 3 months     Review of Systems   Constitutional:  Negative for activity change, appetite change, fatigue and unexpected weight change.   HENT:  Negative for congestion, ear pain, nosebleeds, postnasal drip, rhinorrhea, sneezing and trouble swallowing.    Respiratory:  Negative for cough, shortness of breath and wheezing.    Cardiovascular:  Negative for chest pain, palpitations and leg swelling.   Gastrointestinal:  Negative for abdominal distention, abdominal pain, constipation, diarrhea, nausea and vomiting.   Genitourinary:  Negative for difficulty urinating.   Musculoskeletal:  Negative for arthralgias and gait problem.   Skin:  Negative for rash.   Neurological:  Negative for dizziness, light-headedness, numbness and headaches.   Hematological:  Negative for adenopathy.   Psychiatric/Behavioral:  Positive for confusion and decreased concentration. Negative for agitation, behavioral problems, dysphoric mood and sleep disturbance. The patient is not nervous/anxious.    All other systems reviewed and are negative.      Objective   /60   Pulse 69   Ht 1.499 m (4' 11\")   Wt 58.6 kg (129 lb 1.6 oz)   SpO2 96%   BMI 26.08 kg/m²     Physical Exam  Vitals and nursing note reviewed.   Constitutional:       Appearance: Normal appearance.   HENT:      Head: Normocephalic and atraumatic.      Right Ear: Tympanic membrane, ear canal and external ear normal.      Left Ear: Tympanic membrane, ear canal and external ear normal.      Nose: Nose normal.      Mouth/Throat:      Mouth: Mucous membranes are moist.      " Pharynx: Oropharynx is clear.   Cardiovascular:      Rate and Rhythm: Normal rate and regular rhythm.      Pulses: Normal pulses.      Heart sounds: Normal heart sounds.   Pulmonary:      Effort: Pulmonary effort is normal.      Breath sounds: Normal breath sounds.   Musculoskeletal:      Cervical back: Normal range of motion and neck supple.   Neurological:      General: No focal deficit present.      Mental Status: She is alert and oriented to person, place, and time. Mental status is at baseline.      Cranial Nerves: No cranial nerve deficit.      Sensory: No sensory deficit.      Motor: No weakness.      Coordination: Coordination normal.      Gait: Gait normal.      Deep Tendon Reflexes: Reflexes normal.      Comments: SLUMS 12 out of 30.  Suggestive of dementia.   Psychiatric:         Mood and Affect: Mood normal.         Behavior: Behavior normal.         Assessment/Plan   Problem List Items Addressed This Visit             ICD-10-CM    Chronic renal impairment, stage 3 (moderate) (CMS/HCC) N18.30    Relevant Orders    CBC and Auto Differential (Completed)    Comprehensive Metabolic Panel    Follow Up In Primary Care - Established    Hypertension I10    Relevant Orders    Comprehensive Metabolic Panel    Follow Up In Primary Care - Established    Hypothyroid E03.9    Relevant Orders    TSH with reflex to Free T4 if abnormal    Follow Up In Primary Care - Established    Moderate dementia (CMS/HCC) - Primary F03.B0     Patient with CT of the head done in the emergency room in 2021 that demonstrated microvascular changes to the brain.  Cognitive testing in the office today suggest a degree of dementia.  Patient struggled with short-term memory, clock drawing and understanding number sequences.  Family has noted decrease ability to remember things, some change in mood at times but patient still lives independently and able to manage her own activities of daily living with some supervision.  Will start by checking  blood tests and a urinalysis to rule out reversible causes of memory loss, plan to recheck again in 1 month, at that point consider initiating donepezil, she does not currently seem to be exhibiting signs or symptoms of mood issues but may benefit from addition of a small dose of an SSRI.         Relevant Orders    Vitamin B12    TSH with reflex to Free T4 if abnormal    POCT UA (nonautomated) manually resulted (Completed)    Follow Up In Primary Care - Established     Other Visit Diagnoses         Codes    Confusion     R41.0    Check labs and urinalysis to rule out reversible causes.     Relevant Orders    CBC and Auto Differential (Completed)    Comprehensive Metabolic Panel    Vitamin B12    POCT UA (nonautomated) manually resulted (Completed)    Follow Up In Primary Care - Established             Patient was identified as a fall risk. Risk prevention instructions provided.

## 2024-01-25 NOTE — ASSESSMENT & PLAN NOTE
Patient with CT of the head done in the emergency room in 2021 that demonstrated microvascular changes to the brain.  Cognitive testing in the office today suggest a degree of dementia.  Patient struggled with short-term memory, clock drawing and understanding number sequences.  Family has noted decrease ability to remember things, some change in mood at times but patient still lives independently and able to manage her own activities of daily living with some supervision.  Will start by checking blood tests and a urinalysis to rule out reversible causes of memory loss, plan to recheck again in 1 month, at that point consider initiating donepezil, she does not currently seem to be exhibiting signs or symptoms of mood issues but may benefit from addition of a small dose of an SSRI.

## 2024-01-31 ENCOUNTER — TELEPHONE (OUTPATIENT)
Dept: PRIMARY CARE | Facility: CLINIC | Age: 89
End: 2024-01-31
Payer: MEDICARE

## 2024-02-28 ENCOUNTER — OFFICE VISIT (OUTPATIENT)
Dept: PRIMARY CARE | Facility: CLINIC | Age: 89
End: 2024-02-28
Payer: MEDICARE

## 2024-02-28 VITALS
OXYGEN SATURATION: 97 % | HEART RATE: 58 BPM | WEIGHT: 127 LBS | SYSTOLIC BLOOD PRESSURE: 140 MMHG | BODY MASS INDEX: 25.6 KG/M2 | DIASTOLIC BLOOD PRESSURE: 50 MMHG | HEIGHT: 59 IN

## 2024-02-28 DIAGNOSIS — F03.B0 MODERATE DEMENTIA, UNSPECIFIED DEMENTIA TYPE, UNSPECIFIED WHETHER BEHAVIORAL, PSYCHOTIC, OR MOOD DISTURBANCE OR ANXIETY (MULTI): Primary | ICD-10-CM

## 2024-02-28 DIAGNOSIS — M19.90 OSTEOARTHRITIS, UNSPECIFIED OSTEOARTHRITIS TYPE, UNSPECIFIED SITE: ICD-10-CM

## 2024-02-28 DIAGNOSIS — I10 PRIMARY HYPERTENSION: ICD-10-CM

## 2024-02-28 DIAGNOSIS — E78.2 MIXED HYPERLIPIDEMIA: ICD-10-CM

## 2024-02-28 DIAGNOSIS — R41.0 CONFUSION: ICD-10-CM

## 2024-02-28 DIAGNOSIS — E03.9 ACQUIRED HYPOTHYROIDISM: ICD-10-CM

## 2024-02-28 DIAGNOSIS — J31.0 CHRONIC RHINITIS: ICD-10-CM

## 2024-02-28 PROCEDURE — 1126F AMNT PAIN NOTED NONE PRSNT: CPT | Performed by: FAMILY MEDICINE

## 2024-02-28 PROCEDURE — 1160F RVW MEDS BY RX/DR IN RCRD: CPT | Performed by: FAMILY MEDICINE

## 2024-02-28 PROCEDURE — 3078F DIAST BP <80 MM HG: CPT | Performed by: FAMILY MEDICINE

## 2024-02-28 PROCEDURE — 1036F TOBACCO NON-USER: CPT | Performed by: FAMILY MEDICINE

## 2024-02-28 PROCEDURE — 1159F MED LIST DOCD IN RCRD: CPT | Performed by: FAMILY MEDICINE

## 2024-02-28 PROCEDURE — 3077F SYST BP >= 140 MM HG: CPT | Performed by: FAMILY MEDICINE

## 2024-02-28 PROCEDURE — 99213 OFFICE O/P EST LOW 20 MIN: CPT | Performed by: FAMILY MEDICINE

## 2024-02-28 RX ORDER — FELODIPINE 10 MG/1
10 TABLET, EXTENDED RELEASE ORAL DAILY
Qty: 90 TABLET | Refills: 3 | Status: SHIPPED | OUTPATIENT
Start: 2024-02-28 | End: 2025-02-27

## 2024-02-28 RX ORDER — FLUTICASONE PROPIONATE 50 MCG
1 SPRAY, SUSPENSION (ML) NASAL DAILY
Qty: 16 G | Refills: 3 | Status: SHIPPED | OUTPATIENT
Start: 2024-02-28 | End: 2025-02-27

## 2024-02-28 RX ORDER — MELOXICAM 7.5 MG/1
7.5 TABLET ORAL DAILY
Qty: 90 TABLET | Refills: 3 | Status: SHIPPED | OUTPATIENT
Start: 2024-02-28 | End: 2025-02-27

## 2024-02-28 RX ORDER — DONEPEZIL HYDROCHLORIDE 5 MG/1
5 TABLET, FILM COATED ORAL NIGHTLY
Qty: 30 TABLET | Refills: 5 | Status: SHIPPED | OUTPATIENT
Start: 2024-02-28 | End: 2024-04-30 | Stop reason: SDUPTHER

## 2024-02-28 RX ORDER — LOSARTAN POTASSIUM AND HYDROCHLOROTHIAZIDE 12.5; 1 MG/1; MG/1
1 TABLET ORAL DAILY
Qty: 90 TABLET | Refills: 3 | Status: SHIPPED | OUTPATIENT
Start: 2024-02-28 | End: 2025-02-27

## 2024-02-28 RX ORDER — ATORVASTATIN CALCIUM 20 MG/1
20 TABLET, FILM COATED ORAL DAILY
Qty: 90 TABLET | Refills: 3 | Status: SHIPPED | OUTPATIENT
Start: 2024-02-28 | End: 2025-02-27

## 2024-02-28 RX ORDER — LEVOTHYROXINE SODIUM 50 UG/1
50 TABLET ORAL DAILY
Qty: 90 TABLET | Refills: 3 | Status: SHIPPED | OUTPATIENT
Start: 2024-02-28 | End: 2025-02-27

## 2024-02-28 ASSESSMENT — ENCOUNTER SYMPTOMS
ABDOMINAL PAIN: 0
DYSPHORIC MOOD: 0
CHEST TIGHTNESS: 0
DIARRHEA: 0
CONSTIPATION: 0
AGITATION: 0
APPETITE CHANGE: 0
PALPITATIONS: 0
NUMBNESS: 0
DECREASED CONCENTRATION: 1
NAUSEA: 0
CONFUSION: 1
ACTIVITY CHANGE: 0
DIZZINESS: 0
WEAKNESS: 0
VOMITING: 0
SLEEP DISTURBANCE: 0
SHORTNESS OF BREATH: 0
COUGH: 0
FATIGUE: 0
NERVOUS/ANXIOUS: 0
HEADACHES: 0

## 2024-02-28 NOTE — ASSESSMENT & PLAN NOTE
Patient education information provided for medication and expected management of dementia, will start donepezil 5 mg once a day, increase to 10 mg at follow-up appointment if tolerated, contact the office if any side effects.  Laboratory testing was reviewed with the patient is all within reasonable limits, his CAT scan in the past year demonstrated microvascular issues, neurocognitive testing done at last office visit does suggest dementia.  Patient and family advised about expected course of dementia issues, encouraged to watch for issues related to safety and will plan to recheck again in April.

## 2024-02-28 NOTE — PROGRESS NOTES
"Subjective   Patient ID: Josi Mcneill is a 89 y.o. female who presents for 1 MO CK.    HPI   Patient still living independently, family very attentive, power of  held by family to oversee financial issues, weight seems to be stable, had 1 fall since last seen when trying to get up to go to the toilet at night.    Review of Systems   Constitutional:  Negative for activity change, appetite change and fatigue.   Respiratory:  Negative for cough, chest tightness and shortness of breath.    Cardiovascular:  Negative for chest pain, palpitations and leg swelling.   Gastrointestinal:  Negative for abdominal pain, constipation, diarrhea, nausea and vomiting.   Neurological:  Negative for dizziness, weakness, numbness and headaches.   Psychiatric/Behavioral:  Positive for confusion and decreased concentration. Negative for agitation, dysphoric mood and sleep disturbance. The patient is not nervous/anxious.        Objective   /50   Pulse 58   Ht 1.499 m (4' 11\")   Wt 57.6 kg (127 lb)   SpO2 97%   BMI 25.65 kg/m²     Physical Exam  Vitals and nursing note reviewed.   Constitutional:       Appearance: Normal appearance.   HENT:      Head: Normocephalic and atraumatic.      Right Ear: Tympanic membrane, ear canal and external ear normal.      Left Ear: Tympanic membrane, ear canal and external ear normal.      Nose: Nose normal.      Mouth/Throat:      Mouth: Mucous membranes are moist.      Pharynx: Oropharynx is clear.   Cardiovascular:      Rate and Rhythm: Normal rate and regular rhythm.      Pulses: Normal pulses.      Heart sounds: Normal heart sounds.   Pulmonary:      Effort: Pulmonary effort is normal.      Breath sounds: Normal breath sounds.   Musculoskeletal:      Cervical back: Normal range of motion and neck supple.   Neurological:      Mental Status: She is alert.   Psychiatric:         Mood and Affect: Mood normal.         Behavior: Behavior normal.         Assessment/Plan   Problem List " Items Addressed This Visit             ICD-10-CM    Hyperlipemia E78.5    Relevant Medications    atorvastatin (Lipitor) 20 mg tablet    Hypertension I10    Relevant Medications    losartan-hydrochlorothiazide (Hyzaar) 100-12.5 mg tablet    felodipine ER (Plendil) 10 mg 24 hr tablet    Hypothyroid E03.9    Relevant Medications    levothyroxine (Synthroid, Levoxyl) 50 mcg tablet    Moderate dementia (CMS/HCC) - Primary F03.B0     Patient education information provided for medication and expected management of dementia, will start donepezil 5 mg once a day, increase to 10 mg at follow-up appointment if tolerated, contact the office if any side effects.  Laboratory testing was reviewed with the patient is all within reasonable limits, his CAT scan in the past year demonstrated microvascular issues, neurocognitive testing done at last office visit does suggest dementia.  Patient and family advised about expected course of dementia issues, encouraged to watch for issues related to safety and will plan to recheck again in April.         Relevant Medications    donepezil (Aricept) 5 mg tablet     Other Visit Diagnoses         Codes    Confusion     R41.0    Check labs and urinalysis to rule out reversible causes.     Osteoarthritis, unspecified osteoarthritis type, unspecified site     M19.90    Relevant Medications    meloxicam (Mobic) 7.5 mg tablet    Chronic rhinitis     J31.0    Relevant Medications    fluticasone (Flonase) 50 mcg/actuation nasal spray

## 2024-04-30 ENCOUNTER — OFFICE VISIT (OUTPATIENT)
Dept: PRIMARY CARE | Facility: CLINIC | Age: 89
End: 2024-04-30
Payer: MEDICARE

## 2024-04-30 VITALS
DIASTOLIC BLOOD PRESSURE: 60 MMHG | BODY MASS INDEX: 25.91 KG/M2 | HEIGHT: 59 IN | SYSTOLIC BLOOD PRESSURE: 124 MMHG | OXYGEN SATURATION: 95 % | WEIGHT: 128.5 LBS | HEART RATE: 51 BPM

## 2024-04-30 DIAGNOSIS — F01.B0 MODERATE VASCULAR DEMENTIA WITHOUT BEHAVIORAL DISTURBANCE, PSYCHOTIC DISTURBANCE, MOOD DISTURBANCE, OR ANXIETY (MULTI): Primary | ICD-10-CM

## 2024-04-30 DIAGNOSIS — F03.B0 MODERATE DEMENTIA, UNSPECIFIED DEMENTIA TYPE, UNSPECIFIED WHETHER BEHAVIORAL, PSYCHOTIC, OR MOOD DISTURBANCE OR ANXIETY (MULTI): ICD-10-CM

## 2024-04-30 DIAGNOSIS — E03.9 ACQUIRED HYPOTHYROIDISM: ICD-10-CM

## 2024-04-30 DIAGNOSIS — K21.9 GASTROESOPHAGEAL REFLUX DISEASE WITHOUT ESOPHAGITIS: ICD-10-CM

## 2024-04-30 DIAGNOSIS — I10 PRIMARY HYPERTENSION: ICD-10-CM

## 2024-04-30 DIAGNOSIS — E78.2 MIXED HYPERLIPIDEMIA: ICD-10-CM

## 2024-04-30 DIAGNOSIS — K58.9 IRRITABLE BOWEL SYNDROME, UNSPECIFIED TYPE: ICD-10-CM

## 2024-04-30 DIAGNOSIS — E53.8 VITAMIN B12 DEFICIENCY: ICD-10-CM

## 2024-04-30 DIAGNOSIS — N18.32 CHRONIC RENAL IMPAIRMENT, STAGE 3B (MULTI): ICD-10-CM

## 2024-04-30 PROCEDURE — 3078F DIAST BP <80 MM HG: CPT | Performed by: FAMILY MEDICINE

## 2024-04-30 PROCEDURE — 1036F TOBACCO NON-USER: CPT | Performed by: FAMILY MEDICINE

## 2024-04-30 PROCEDURE — 3074F SYST BP LT 130 MM HG: CPT | Performed by: FAMILY MEDICINE

## 2024-04-30 PROCEDURE — 1160F RVW MEDS BY RX/DR IN RCRD: CPT | Performed by: FAMILY MEDICINE

## 2024-04-30 PROCEDURE — 99213 OFFICE O/P EST LOW 20 MIN: CPT | Performed by: FAMILY MEDICINE

## 2024-04-30 PROCEDURE — 1159F MED LIST DOCD IN RCRD: CPT | Performed by: FAMILY MEDICINE

## 2024-04-30 RX ORDER — DONEPEZIL HYDROCHLORIDE 10 MG/1
10 TABLET, FILM COATED ORAL NIGHTLY
Qty: 90 TABLET | Refills: 3 | Status: SHIPPED | OUTPATIENT
Start: 2024-04-30 | End: 2025-04-30

## 2024-04-30 ASSESSMENT — ENCOUNTER SYMPTOMS
SLEEP DISTURBANCE: 0
ABDOMINAL PAIN: 0
CONSTIPATION: 0
DYSPHORIC MOOD: 0
HEADACHES: 0
COUGH: 0
ARTHRALGIAS: 0
DIARRHEA: 0
AGITATION: 0
DIZZINESS: 0
VOMITING: 0
LIGHT-HEADEDNESS: 0
ABDOMINAL DISTENTION: 0
NAUSEA: 0
ACTIVITY CHANGE: 0
PALPITATIONS: 0
DIFFICULTY URINATING: 0
WHEEZING: 0
ADENOPATHY: 0
RHINORRHEA: 0
TROUBLE SWALLOWING: 0
FATIGUE: 0
APPETITE CHANGE: 0
UNEXPECTED WEIGHT CHANGE: 0
SHORTNESS OF BREATH: 0
NUMBNESS: 0
NERVOUS/ANXIOUS: 0

## 2024-04-30 NOTE — PROGRESS NOTES
"Subjective   Patient ID: Josi Mcneill is a 89 y.o. female who presents for 6 MO F/U.    HPI   No headache, chest pain, shortness of breath, dizziness, lightheadedness, or edema  The patient is taking thyroid medications as directed without problems, labs done in the last year, no symptoms of excessive fatigue, edema or weight gain.  Some dizziness in Am, no falls, no nausea or upset stomach  Emotionally doing well, family helps    Review of Systems   Constitutional:  Negative for activity change, appetite change, fatigue and unexpected weight change.   HENT:  Negative for ear pain, nosebleeds, rhinorrhea, sneezing and trouble swallowing.    Respiratory:  Negative for cough, shortness of breath and wheezing.    Cardiovascular:  Negative for chest pain, palpitations and leg swelling.   Gastrointestinal:  Negative for abdominal distention, abdominal pain, constipation, diarrhea, nausea and vomiting.   Genitourinary:  Negative for difficulty urinating.   Musculoskeletal:  Positive for gait problem. Negative for arthralgias.   Skin:  Negative for rash.   Neurological:  Negative for dizziness, light-headedness, numbness and headaches.   Hematological:  Negative for adenopathy.   Psychiatric/Behavioral:  Negative for agitation, behavioral problems, dysphoric mood and sleep disturbance. The patient is not nervous/anxious.    All other systems reviewed and are negative.      Objective   /60   Pulse 51   Ht 1.499 m (4' 11\")   Wt 58.3 kg (128 lb 8 oz)   SpO2 95%   BMI 25.95 kg/m²     Physical Exam  Vitals and nursing note reviewed.   Constitutional:       Appearance: Normal appearance.   HENT:      Head: Normocephalic and atraumatic.      Right Ear: Tympanic membrane, ear canal and external ear normal.      Left Ear: Tympanic membrane, ear canal and external ear normal.      Nose: Nose normal.      Mouth/Throat:      Mouth: Mucous membranes are moist.      Pharynx: Oropharynx is clear.   Cardiovascular:      " Rate and Rhythm: Normal rate and regular rhythm.      Pulses: Normal pulses.      Heart sounds: Normal heart sounds.   Pulmonary:      Effort: Pulmonary effort is normal.      Breath sounds: Normal breath sounds.   Musculoskeletal:      Cervical back: Normal range of motion and neck supple.   Neurological:      Mental Status: She is alert.   Psychiatric:         Mood and Affect: Mood normal.         Behavior: Behavior normal.         Assessment/Plan   Problem List Items Addressed This Visit             ICD-10-CM    Chronic renal impairment, stage 3 (moderate) (Multi) N18.30     No change with medication, caution with nonsteroidal anti-inflammatories, check labs at follow-up appointment.         Relevant Orders    Follow Up In Primary Care - Established    GERD (gastroesophageal reflux disease) K21.9     Currently stable no issues with dysphagia.         Relevant Orders    Follow Up In Primary Care - Established    Hyperlipemia E78.5     No change, continue with atorvastatin recheck at follow-up.         Relevant Orders    Follow Up In Primary Care - Established    Comprehensive Metabolic Panel    Lipid Panel    Hypertension I10     Pressure today in the office is good and, check labs at follow-up no change.         Relevant Orders    Follow Up In Primary Care - Established    Comprehensive Metabolic Panel    Hypothyroid E03.9     Thyroid testing done in January was stable no change.         Relevant Orders    Follow Up In Primary Care - Established    IBS (irritable bowel syndrome) K58.9    Relevant Orders    Follow Up In Primary Care - Established    Vitamin B12 deficiency E53.8     Check labs at follow-up appointment.         Relevant Orders    Follow Up In Primary Care - Established    Moderate dementia (Multi) - Primary F03.B0     Optimize donepezil 10 mg once a day, behaviors seem to be stable at home, family very helpful and supportive, reemphasized to the patient that she has dementia today, patient was  debating this fact.  Did very poorly on slums examination earlier this year.         Relevant Medications    donepezil (Aricept) 10 mg tablet    Other Relevant Orders    Follow Up In Primary Care - Established    Follow Up In Primary Care - Established

## 2024-04-30 NOTE — ASSESSMENT & PLAN NOTE
Optimize donepezil 10 mg once a day, behaviors seem to be stable at home, family very helpful and supportive, reemphasized to the patient that she has dementia today, patient was debating this fact.  Did very poorly on slums examination earlier this year.

## 2024-04-30 NOTE — ASSESSMENT & PLAN NOTE
No change with medication, caution with nonsteroidal anti-inflammatories, check labs at follow-up appointment.

## 2024-05-30 ENCOUNTER — APPOINTMENT (OUTPATIENT)
Dept: RADIOLOGY | Facility: HOSPITAL | Age: 89
End: 2024-05-30
Payer: MEDICARE

## 2024-05-30 ENCOUNTER — APPOINTMENT (OUTPATIENT)
Dept: CARDIOLOGY | Facility: HOSPITAL | Age: 89
End: 2024-05-30
Payer: MEDICARE

## 2024-05-30 ENCOUNTER — HOSPITAL ENCOUNTER (EMERGENCY)
Facility: HOSPITAL | Age: 89
Discharge: HOME | End: 2024-05-30
Attending: EMERGENCY MEDICINE
Payer: MEDICARE

## 2024-05-30 VITALS
RESPIRATION RATE: 18 BRPM | OXYGEN SATURATION: 96 % | DIASTOLIC BLOOD PRESSURE: 66 MMHG | BODY MASS INDEX: 26.21 KG/M2 | SYSTOLIC BLOOD PRESSURE: 157 MMHG | WEIGHT: 130 LBS | HEIGHT: 59 IN | HEART RATE: 62 BPM | TEMPERATURE: 98 F

## 2024-05-30 DIAGNOSIS — R53.1 GENERALIZED WEAKNESS: Primary | ICD-10-CM

## 2024-05-30 LAB
ALBUMIN SERPL BCP-MCNC: 4.6 G/DL (ref 3.4–5)
ALP SERPL-CCNC: 91 U/L (ref 33–136)
ALT SERPL W P-5'-P-CCNC: 15 U/L (ref 7–45)
ANION GAP SERPL CALC-SCNC: 12 MMOL/L (ref 10–20)
APPEARANCE UR: CLEAR
AST SERPL W P-5'-P-CCNC: 23 U/L (ref 9–39)
BASOPHILS # BLD AUTO: 0.02 X10*3/UL (ref 0–0.1)
BASOPHILS NFR BLD AUTO: 0.4 %
BILIRUB SERPL-MCNC: 0.9 MG/DL (ref 0–1.2)
BILIRUB UR STRIP.AUTO-MCNC: NEGATIVE MG/DL
BUN SERPL-MCNC: 30 MG/DL (ref 6–23)
CALCIUM SERPL-MCNC: 9.6 MG/DL (ref 8.6–10.3)
CARDIAC TROPONIN I PNL SERPL HS: 8 NG/L (ref 0–13)
CHLORIDE SERPL-SCNC: 101 MMOL/L (ref 98–107)
CO2 SERPL-SCNC: 27 MMOL/L (ref 21–32)
COLOR UR: NORMAL
CREAT SERPL-MCNC: 1.17 MG/DL (ref 0.5–1.05)
EGFRCR SERPLBLD CKD-EPI 2021: 42 ML/MIN/1.73M*2
EOSINOPHIL # BLD AUTO: 0.12 X10*3/UL (ref 0–0.4)
EOSINOPHIL NFR BLD AUTO: 2.4 %
ERYTHROCYTE [DISTWIDTH] IN BLOOD BY AUTOMATED COUNT: 13 % (ref 11.5–14.5)
GLUCOSE SERPL-MCNC: 101 MG/DL (ref 74–99)
GLUCOSE UR STRIP.AUTO-MCNC: NEGATIVE MG/DL
HCT VFR BLD AUTO: 45.2 % (ref 36–46)
HGB BLD-MCNC: 14.6 G/DL (ref 12–16)
HOLD SPECIMEN: NORMAL
IMM GRANULOCYTES # BLD AUTO: 0.01 X10*3/UL (ref 0–0.5)
IMM GRANULOCYTES NFR BLD AUTO: 0.2 % (ref 0–0.9)
KETONES UR STRIP.AUTO-MCNC: NEGATIVE MG/DL
LEUKOCYTE ESTERASE UR QL STRIP.AUTO: NEGATIVE
LYMPHOCYTES # BLD AUTO: 1.86 X10*3/UL (ref 0.8–3)
LYMPHOCYTES NFR BLD AUTO: 36.5 %
MAGNESIUM SERPL-MCNC: 2.15 MG/DL (ref 1.6–2.4)
MCH RBC QN AUTO: 30.9 PG (ref 26–34)
MCHC RBC AUTO-ENTMCNC: 32.3 G/DL (ref 32–36)
MCV RBC AUTO: 96 FL (ref 80–100)
MONOCYTES # BLD AUTO: 0.47 X10*3/UL (ref 0.05–0.8)
MONOCYTES NFR BLD AUTO: 9.2 %
NEUTROPHILS # BLD AUTO: 2.61 X10*3/UL (ref 1.6–5.5)
NEUTROPHILS NFR BLD AUTO: 51.3 %
NITRITE UR QL STRIP.AUTO: NEGATIVE
NRBC BLD-RTO: 0 /100 WBCS (ref 0–0)
PH UR STRIP.AUTO: 7 [PH]
PLATELET # BLD AUTO: 168 X10*3/UL (ref 150–450)
POTASSIUM SERPL-SCNC: 4.1 MMOL/L (ref 3.5–5.3)
PROT SERPL-MCNC: 7 G/DL (ref 6.4–8.2)
PROT UR STRIP.AUTO-MCNC: NEGATIVE MG/DL
RBC # BLD AUTO: 4.73 X10*6/UL (ref 4–5.2)
RBC # UR STRIP.AUTO: NEGATIVE /UL
SODIUM SERPL-SCNC: 136 MMOL/L (ref 136–145)
SP GR UR STRIP.AUTO: 1.01
UROBILINOGEN UR STRIP.AUTO-MCNC: <2 MG/DL
WBC # BLD AUTO: 5.1 X10*3/UL (ref 4.4–11.3)

## 2024-05-30 PROCEDURE — 72125 CT NECK SPINE W/O DYE: CPT

## 2024-05-30 PROCEDURE — 71045 X-RAY EXAM CHEST 1 VIEW: CPT

## 2024-05-30 PROCEDURE — 84484 ASSAY OF TROPONIN QUANT: CPT | Performed by: EMERGENCY MEDICINE

## 2024-05-30 PROCEDURE — 81003 URINALYSIS AUTO W/O SCOPE: CPT | Performed by: EMERGENCY MEDICINE

## 2024-05-30 PROCEDURE — 72125 CT NECK SPINE W/O DYE: CPT | Performed by: RADIOLOGY

## 2024-05-30 PROCEDURE — 70450 CT HEAD/BRAIN W/O DYE: CPT | Performed by: RADIOLOGY

## 2024-05-30 PROCEDURE — 70450 CT HEAD/BRAIN W/O DYE: CPT

## 2024-05-30 PROCEDURE — 85025 COMPLETE CBC W/AUTO DIFF WBC: CPT | Performed by: EMERGENCY MEDICINE

## 2024-05-30 PROCEDURE — 83735 ASSAY OF MAGNESIUM: CPT | Performed by: EMERGENCY MEDICINE

## 2024-05-30 PROCEDURE — 96360 HYDRATION IV INFUSION INIT: CPT

## 2024-05-30 PROCEDURE — 80053 COMPREHEN METABOLIC PANEL: CPT | Performed by: EMERGENCY MEDICINE

## 2024-05-30 PROCEDURE — 36415 COLL VENOUS BLD VENIPUNCTURE: CPT | Performed by: EMERGENCY MEDICINE

## 2024-05-30 PROCEDURE — 2500000004 HC RX 250 GENERAL PHARMACY W/ HCPCS (ALT 636 FOR OP/ED): Performed by: EMERGENCY MEDICINE

## 2024-05-30 PROCEDURE — 99285 EMERGENCY DEPT VISIT HI MDM: CPT | Mod: 25

## 2024-05-30 PROCEDURE — 71045 X-RAY EXAM CHEST 1 VIEW: CPT | Performed by: RADIOLOGY

## 2024-05-30 PROCEDURE — 93005 ELECTROCARDIOGRAM TRACING: CPT

## 2024-05-30 RX ORDER — LORATADINE 10 MG/1
10 TABLET ORAL DAILY PRN
COMMUNITY

## 2024-05-30 RX ORDER — METRONIDAZOLE 10 MG/G
1 GEL TOPICAL DAILY PRN
COMMUNITY

## 2024-05-30 RX ORDER — LANOLIN ALCOHOL/MO/W.PET/CERES
1000 CREAM (GRAM) TOPICAL DAILY
COMMUNITY

## 2024-05-30 RX ADMIN — SODIUM CHLORIDE 500 ML: 9 INJECTION, SOLUTION INTRAVENOUS at 09:36

## 2024-05-30 ASSESSMENT — COLUMBIA-SUICIDE SEVERITY RATING SCALE - C-SSRS
1. IN THE PAST MONTH, HAVE YOU WISHED YOU WERE DEAD OR WISHED YOU COULD GO TO SLEEP AND NOT WAKE UP?: NO
6. HAVE YOU EVER DONE ANYTHING, STARTED TO DO ANYTHING, OR PREPARED TO DO ANYTHING TO END YOUR LIFE?: NO
2. HAVE YOU ACTUALLY HAD ANY THOUGHTS OF KILLING YOURSELF?: NO

## 2024-05-30 ASSESSMENT — PAIN - FUNCTIONAL ASSESSMENT
PAIN_FUNCTIONAL_ASSESSMENT: 0-10
PAIN_FUNCTIONAL_ASSESSMENT: 0-10

## 2024-05-30 ASSESSMENT — PAIN SCALES - GENERAL
PAINLEVEL_OUTOF10: 0 - NO PAIN
PAINLEVEL_OUTOF10: 0 - NO PAIN

## 2024-05-30 NOTE — ED PROVIDER NOTES
HPI   No chief complaint on file.      Limitations to History: None    HPI: 89-year-old female presents with concern for dizziness as well as elevated blood pressure.  Feeling fatigued over the past 2 to 3 days.  Fell yesterday after losing her balance.  No head injury.  Denies any chest pain, shortness of breath, nausea, vomiting, abdominal pain, urinary symptoms.    Additional History Obtained from: Family at the bedside.    ------------------------------------------------------------------------------------------------------------------------------------------  Physical Exam:    VS: As documented in the triage note and EMR flowsheet from this visit were reviewed.    Appearance: Alert. cooperative,  in no acute distress.   Skin: Intact,  dry skin, no lesions, rash, petechiae or purpura.   Eyes: PERRLA, EOMs intact,  Conjunctiva pink with no redness or exudates.   HENT: Normocephalic, atraumatic. Nares patent. No intraoral lesions.   Neck: Supple, without meningismus. Trachea at midline. No lymphadenopathy.  Pulmonary: Clear bilaterally with good chest wall excursion. No rales, rhonchi or wheezing. No accessory muscle use or stridor.  Cardiac: Regular rate and rhythm, no rubs, murmurs, or gallops.   Abdomen: Abdomen is soft, nontender, and nondistended.  No palpable organomegaly.  No rebound or guarding.  No CVA tenderness. Nonsurgical abdomen.  Genitourinary: Exam deferred.  Musculoskeletal: Full range of motion.  Pulses full and equal. No cyanosis, clubbing, or edema.  Neurological:  Cranial nerves are grossly intact, grossly normal sensation, no weakness, no focal findings identified.  Psychiatric: Appropriate mood and affect.                            No data recorded                   Patient History   Past Medical History:   Diagnosis Date    Abnormal urine odor 04/17/2023    Dysuria 04/17/2023    Personal history of other drug therapy     History of postmenopausal hormone replacement therapy    Personal  history of other medical treatment     History of bone density study    Personal history of other medical treatment     History of mammogram    Personal history of other medical treatment     History of bone density study    Personal history of other medical treatment     History of mammogram     Past Surgical History:   Procedure Laterality Date    OTHER SURGICAL HISTORY  02/25/2020    Bunionectomy    OTHER SURGICAL HISTORY  02/25/2020    Carpal tunnel surgery    OTHER SURGICAL HISTORY  02/25/2020    Colonoscopy    OTHER SURGICAL HISTORY  02/25/2020    Surgery    OTHER SURGICAL HISTORY  02/25/2020    Tonsillectomy with adenoidectomy    OTHER SURGICAL HISTORY  02/25/2020    Cholecystectomy    OTHER SURGICAL HISTORY  02/25/2020    Tonsillectomy with adenoidectomy     Family History   Problem Relation Name Age of Onset    Heart failure Mother      Coronary artery disease Mother      No Known Problems Father      Coronary artery disease Sister      Pancreatic cancer Sister      Heart attack Brother      Atrial fibrillation Brother      Other (CABG) Brother      Coronary artery disease Brother      Other (PERIPHERAL ARTERY DISEASE) Brother      Other (HISTORY OF STENT) Brother      Hypertension Child      Cancer Child       Social History     Tobacco Use    Smoking status: Never     Passive exposure: Never    Smokeless tobacco: Never   Vaping Use    Vaping status: Never Used   Substance Use Topics    Alcohol use: Not Currently    Drug use: Defer       Physical Exam   ED Triage Vitals   Temp Pulse Resp BP   -- -- -- --      SpO2 Temp src Heart Rate Source Patient Position   -- -- -- --      BP Location FiO2 (%)     -- --       Physical Exam    ED Course & MDM   Diagnoses as of 05/30/24 1112   Generalized weakness       Medical Decision Making  Labs Reviewed  COMPREHENSIVE METABOLIC PANEL - Abnormal     Glucose                       101 (*)                Sodium                        136                    Potassium                      4.1                    Chloride                      101                    Bicarbonate                   27                     Anion Gap                     12                     Urea Nitrogen                 30 (*)                 Creatinine                    1.17 (*)               eGFR                          42 (*)                 Calcium                       9.6                    Albumin                       4.6                    Alkaline Phosphatase          91                     Total Protein                 7.0                    AST                           23                     Bilirubin, Total              0.9                    ALT                           15                  MAGNESIUM - Normal     Magnesium                     2.15                TROPONIN I, HIGH SENSITIVITY - Normal     Troponin I, High Sensitivity   8                          Narrative: Less than 99th percentile of normal range cutoff-                  Female and children under 18 years old <14 ng/L; Male <21 ng/L: Negative                  Repeat testing should be performed if clinically indicated.                                     Female and children under 18 years old 14-50 ng/L; Male 21-50 ng/L:                  Consistent with possible cardiac damage and possible increased clinical                   risk. Serial measurements may help to assess extent of myocardial damage.                                     >50 ng/L: Consistent with cardiac damage, increased clinical risk and                  myocardial infarction. Serial measurements may help assess extent of                   myocardial damage.                                      NOTE: Children less than 1 year old may have higher baseline troponin                   levels and results should be interpreted in conjunction with the overall                   clinical context.                                     NOTE: Troponin I testing is performed  using a different                   testing methodology at [facility]) than at other                   [facility]. Direct result comparisons should only                   be made within the same method.  URINALYSIS WITH REFLEX CULTURE AND MICROSCOPIC - Normal     Color, Urine                  Straw                  Appearance, Urine             Clear                  Specific Gravity, Urine       1.008                  pH, Urine                     7.0                    Protein, Urine                NEGATIVE                Glucose, Urine                NEGATIVE                Blood, Urine                  NEGATIVE                Ketones, Urine                NEGATIVE                Bilirubin, Urine              NEGATIVE                Urobilinogen, Urine           <2.0                   Nitrite, Urine                NEGATIVE                Leukocyte Esterase, Urine     NEGATIVE             CBC WITH AUTO DIFFERENTIAL     WBC                           5.1                    nRBC                          0.0                    RBC                           4.73                   Hemoglobin                    14.6                   Hematocrit                    45.2                   MCV                           96                     MCH                           30.9                   MCHC                          32.3                   RDW                           13.0                   Platelets                     168                    Neutrophils %                 51.3                   Immature Granulocytes %, Automated   0.2                    Lymphocytes %                 36.5                   Monocytes %                   9.2                    Eosinophils %                 2.4                    Basophils %                   0.4                    Neutrophils Absolute          2.61                   Immature Granulocytes Absolute, Au*   0.01                   Lymphocytes Absolute           1.86                   Monocytes Absolute            0.47                   Eosinophils Absolute          0.12                   Basophils Absolute            0.02                URINALYSIS WITH REFLEX CULTURE AND MICROSCOPIC         Narrative: The following orders were created for panel order Urinalysis with Reflex Culture and Microscopic.                  Procedure                               Abnormality         Status                                     ---------                               -----------         ------                                     Urinalysis with Reflex C...[[id number]]  Normal              Final result                               Extra Urine Gray Tube[[id number]]                            In process                                                   Please view results for these tests on the individual orders.  EXTRA URINE GRAY TUBE  CT head wo IV contrast   Final Result    Diffuse volume loss and periventricular white matter changes, which    given patient's age likely represent small vessel ischemic disease.          No CT evidence of acute hemorrhage or acute cortical infarct.          No evidence of displaced skull fracture.                MACRO:    None          Signed by: Tracey Bello 5/30/2024 10:35 AM    Dictation workstation:   [facility]     CT cervical spine wo IV contrast   Final Result    No evidence for an acute fracture or subluxation of the cervical    spine.          Reversal normal cervical lordosis and degenerative changes          MACRO:    None          Signed by: Tracey Bello 5/30/2024 10:43 AM    Dictation workstation:   [facility]     XR chest 1 view   Final Result    Cardiomegaly and probable chronic lung changes                      MACRO:    None          Signed by: Tracey Bello 5/30/2024 9:42 AM    Dictation workstation:   [facility]     Medical Decision Making:    Patient appears well nontoxic.  Hypertensive upon arrival 184/76 which is improved  without treatment to 153/62.  Lab work unremarkable.  Chronic kidney disease at baseline.  CT brain and cervical spine negative.  Troponin negative.  Chest x-ray clear.  Patient treated with 500 mL normal saline.  Able to get up and go to the bedside commode.  May be a side effect of her Aricept.  Advised on using walker at home and follow-up with primary care about possible medication titration.  Patient and daughter agreeable and discharged home in stable condition.    Differential Diagnoses Considered: Volume depletion, electrolyte abnormality, ACS, intracranial hemorrhage, medication side effect    Independent Interpretation of Studies:  I independently interpreted: CT brain shows no intracranial hemorrhage.  CT cervical spine without fracture or dislocation.  Chest x-ray without pneumonia or pneumothorax.    Escalation of Care:  Appropriate for discharge and follow-up with primary care.          Procedure  ECG 12 lead    Performed by: Jv Victor DO  Authorized by: Jv Victor DO    ECG interpreted by ED Physician in the absence of a cardiologist: yes    Comments:      EKG interpreted by Dr. Jv Victor: Normal sinus rhythm at 73 bpm.  OH interval 164 ms.  QTc of 427 ms.  Nonspecific ST changes.       Jv Victor DO  05/30/24 1119

## 2024-06-01 LAB
ATRIAL RATE: 73 BPM
P AXIS: 70 DEGREES
P OFFSET: 180 MS
P ONSET: 134 MS
PR INTERVAL: 164 MS
Q ONSET: 216 MS
QRS COUNT: 12 BEATS
QRS DURATION: 76 MS
QT INTERVAL: 388 MS
QTC CALCULATION(BAZETT): 427 MS
QTC FREDERICIA: 414 MS
R AXIS: -2 DEGREES
T AXIS: 41 DEGREES
T OFFSET: 410 MS
VENTRICULAR RATE: 73 BPM

## 2024-06-03 ENCOUNTER — OFFICE VISIT (OUTPATIENT)
Dept: PRIMARY CARE | Facility: CLINIC | Age: 89
End: 2024-06-03
Payer: MEDICARE

## 2024-06-03 VITALS
HEIGHT: 59 IN | HEART RATE: 61 BPM | OXYGEN SATURATION: 96 % | DIASTOLIC BLOOD PRESSURE: 54 MMHG | BODY MASS INDEX: 25.95 KG/M2 | WEIGHT: 128.7 LBS | SYSTOLIC BLOOD PRESSURE: 120 MMHG

## 2024-06-03 DIAGNOSIS — R53.82 CHRONIC FATIGUE: ICD-10-CM

## 2024-06-03 DIAGNOSIS — R42 DIZZINESS: ICD-10-CM
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
## 2024-06-03 DIAGNOSIS — F01.B0 MODERATE VASCULAR DEMENTIA WITHOUT BEHAVIORAL DISTURBANCE, PSYCHOTIC DISTURBANCE, MOOD DISTURBANCE, OR ANXIETY (MULTI): ICD-10-CM

## 2024-06-03 DIAGNOSIS — I10 PRIMARY HYPERTENSION: Primary | ICD-10-CM

## 2024-06-03 PROCEDURE — 1159F MED LIST DOCD IN RCRD: CPT | Performed by: FAMILY MEDICINE

## 2024-06-03 PROCEDURE — 1036F TOBACCO NON-USER: CPT | Performed by: FAMILY MEDICINE

## 2024-06-03 PROCEDURE — 3078F DIAST BP <80 MM HG: CPT | Performed by: FAMILY MEDICINE

## 2024-06-03 PROCEDURE — 3074F SYST BP LT 130 MM HG: CPT | Performed by: FAMILY MEDICINE

## 2024-06-03 PROCEDURE — 99213 OFFICE O/P EST LOW 20 MIN: CPT | Performed by: FAMILY MEDICINE

## 2024-06-03 PROCEDURE — 1160F RVW MEDS BY RX/DR IN RCRD: CPT | Performed by: FAMILY MEDICINE

## 2024-06-03 ASSESSMENT — ENCOUNTER SYMPTOMS
FATIGUE: 1
ABDOMINAL PAIN: 0
NAUSEA: 0
LIGHT-HEADEDNESS: 0
UNEXPECTED WEIGHT CHANGE: 0
WEAKNESS: 1
HEADACHES: 0
TROUBLE SWALLOWING: 0
NUMBNESS: 0
DIZZINESS: 0
RHINORRHEA: 0
DYSPHORIC MOOD: 0
NERVOUS/ANXIOUS: 0
VOMITING: 0
SHORTNESS OF BREATH: 0
ARTHRALGIAS: 0
ABDOMINAL DISTENTION: 0
COUGH: 0
APPETITE CHANGE: 0
DIARRHEA: 0
ACTIVITY CHANGE: 0
CONSTIPATION: 0
PALPITATIONS: 0
WHEEZING: 0
SLEEP DISTURBANCE: 0
DIFFICULTY URINATING: 0
ADENOPATHY: 0

## 2024-06-03 NOTE — ASSESSMENT & PLAN NOTE
Blood pressure in the office today is normal, continue with current blood pressure medication, call if systolic pressure up to 200.

## 2024-06-03 NOTE — PROGRESS NOTES
"Subjective   Patient ID: Josi Mcneill is a 89 y.o. female who presents for ER F/U increased BP Dizziness.    HPI   Was in ER 5/30 for dizziness and fatigue 3 days prior, had a fall the day prior to ER  Feeling more fatigued and trouble walking, HBP elevated  Not using a walker  Donepezil has helped with moods and memory    Review of Systems   Constitutional:  Positive for fatigue. Negative for activity change, appetite change and unexpected weight change.   HENT:  Negative for ear pain, nosebleeds, rhinorrhea, sneezing and trouble swallowing.    Respiratory:  Negative for cough, shortness of breath and wheezing.    Cardiovascular:  Negative for chest pain, palpitations and leg swelling.   Gastrointestinal:  Negative for abdominal distention, abdominal pain, constipation, diarrhea, nausea and vomiting.   Genitourinary:  Negative for difficulty urinating.   Musculoskeletal:  Positive for gait problem. Negative for arthralgias.   Skin:  Negative for rash.   Neurological:  Positive for weakness. Negative for dizziness, light-headedness, numbness and headaches.   Hematological:  Negative for adenopathy.   Psychiatric/Behavioral:  Negative for behavioral problems, dysphoric mood and sleep disturbance. The patient is not nervous/anxious.    All other systems reviewed and are negative.      Objective   /54   Pulse 61   Ht 1.499 m (4' 11\")   Wt 58.4 kg (128 lb 11.2 oz)   SpO2 96%   BMI 25.99 kg/m²     Physical Exam  Vitals and nursing note reviewed.   Constitutional:       Appearance: Normal appearance.   HENT:      Head: Normocephalic and atraumatic.      Right Ear: Tympanic membrane, ear canal and external ear normal.      Left Ear: Tympanic membrane, ear canal and external ear normal.      Nose: Nose normal.      Mouth/Throat:      Mouth: Mucous membranes are moist.      Pharynx: Oropharynx is clear.   Cardiovascular:      Rate and Rhythm: Normal rate and regular rhythm.      Pulses: Normal pulses.      " Heart sounds: Normal heart sounds.   Pulmonary:      Effort: Pulmonary effort is normal.      Breath sounds: Normal breath sounds.   Musculoskeletal:         General: Normal range of motion.      Cervical back: Normal range of motion and neck supple.   Neurological:      Mental Status: She is alert.   Psychiatric:         Mood and Affect: Mood normal.         Behavior: Behavior normal.         Assessment/Plan   Problem List Items Addressed This Visit             ICD-10-CM    Fatigue R53.83     Patient with increasing fatigue and weakness, laboratory testing done in the emergency room was reviewed and was within normal limits, will set up for home physical therapy and nurse to try to improve balance and reduce risk for falls.         Relevant Orders    Follow Up In Primary Care - Established    Referral to Home Health    Hypertension - Primary I10     Blood pressure in the office today is normal, continue with current blood pressure medication, call if systolic pressure up to 200.         Relevant Orders    Follow Up In Primary Care - Established    Referral to Home Health    Dizziness R42    Relevant Orders    Follow Up In Primary Care - Established    Referral to Home Health    Moderate dementia (Multi) F03.B0     Improvement in mood and memory with donepezil, try to continue medication if possible.         Relevant Orders    Follow Up In Primary Care - Established    Referral to Home Health     Other Visit Diagnoses         Codes    Fall, subsequent encounter     W19.XXXD    Relevant Orders    Follow Up In Primary Care - Established    Referral to Home Health             Patient was identified as a fall risk. Risk prevention instructions provided.

## 2024-06-04 ENCOUNTER — DOCUMENTATION (OUTPATIENT)
Dept: HOME HEALTH SERVICES | Facility: HOME HEALTH | Age: 89
End: 2024-06-04
Payer: MEDICARE

## 2024-06-04 ENCOUNTER — HOME HEALTH ADMISSION (OUTPATIENT)
Dept: HOME HEALTH SERVICES | Facility: HOME HEALTH | Age: 89
End: 2024-06-04
Payer: MEDICARE

## 2024-06-04 NOTE — HH CARE COORDINATION
Home Care received a Referral for Nursing and Physical Therapy. We have processed the referral for a Start of Care on 06/05/2024.     If you have any questions or concerns, please feel free to contact us at [phone]. Follow the prompts, enter your five digit zip code, and you will be directed to your care team on [facility] 1.

## 2024-07-01 ENCOUNTER — APPOINTMENT (OUTPATIENT)
Dept: PRIMARY CARE | Facility: CLINIC | Age: 89
End: 2024-07-01
Payer: MEDICARE

## 2024-07-02 ENCOUNTER — OFFICE VISIT (OUTPATIENT)
Dept: PRIMARY CARE | Facility: CLINIC | Age: 89
End: 2024-07-02
Payer: MEDICARE

## 2024-07-02 VITALS
WEIGHT: 137.4 LBS | BODY MASS INDEX: 27.7 KG/M2 | SYSTOLIC BLOOD PRESSURE: 118 MMHG | HEART RATE: 62 BPM | OXYGEN SATURATION: 95 % | HEIGHT: 59 IN | DIASTOLIC BLOOD PRESSURE: 62 MMHG

## 2024-07-02 DIAGNOSIS — E78.2 MIXED HYPERLIPIDEMIA: ICD-10-CM

## 2024-07-02 DIAGNOSIS — R42 DIZZINESS: ICD-10-CM

## 2024-07-02 DIAGNOSIS — R53.82 CHRONIC FATIGUE: ICD-10-CM

## 2024-07-02 DIAGNOSIS — I10 PRIMARY HYPERTENSION: Primary | ICD-10-CM

## 2024-07-02 DIAGNOSIS — F01.B0 MODERATE VASCULAR DEMENTIA WITHOUT BEHAVIORAL DISTURBANCE, PSYCHOTIC DISTURBANCE, MOOD DISTURBANCE, OR ANXIETY (MULTI): ICD-10-CM

## 2024-07-02 DIAGNOSIS — N18.32 CHRONIC RENAL IMPAIRMENT, STAGE 3B (MULTI): ICD-10-CM

## 2024-07-02 PROCEDURE — 1159F MED LIST DOCD IN RCRD: CPT | Performed by: FAMILY MEDICINE

## 2024-07-02 PROCEDURE — 99213 OFFICE O/P EST LOW 20 MIN: CPT | Performed by: FAMILY MEDICINE

## 2024-07-02 PROCEDURE — 3078F DIAST BP <80 MM HG: CPT | Performed by: FAMILY MEDICINE

## 2024-07-02 PROCEDURE — 1036F TOBACCO NON-USER: CPT | Performed by: FAMILY MEDICINE

## 2024-07-02 PROCEDURE — 3074F SYST BP LT 130 MM HG: CPT | Performed by: FAMILY MEDICINE

## 2024-07-02 ASSESSMENT — ENCOUNTER SYMPTOMS
HEADACHES: 0
DYSPHORIC MOOD: 0
DIZZINESS: 1
FATIGUE: 0
VOMITING: 0
DIARRHEA: 0
CHEST TIGHTNESS: 0
PALPITATIONS: 0
APPETITE CHANGE: 0
SHORTNESS OF BREATH: 0
ACTIVITY CHANGE: 0
CONSTIPATION: 0
NAUSEA: 0
COUGH: 0
SLEEP DISTURBANCE: 0
ABDOMINAL PAIN: 0
LIGHT-HEADEDNESS: 1

## 2024-07-02 ASSESSMENT — PATIENT HEALTH QUESTIONNAIRE - PHQ9
2. FEELING DOWN, DEPRESSED OR HOPELESS: SEVERAL DAYS
1. LITTLE INTEREST OR PLEASURE IN DOING THINGS: NOT AT ALL
10. IF YOU CHECKED OFF ANY PROBLEMS, HOW DIFFICULT HAVE THESE PROBLEMS MADE IT FOR YOU TO DO YOUR WORK, TAKE CARE OF THINGS AT HOME, OR GET ALONG WITH OTHER PEOPLE: NOT DIFFICULT AT ALL
SUM OF ALL RESPONSES TO PHQ9 QUESTIONS 1 AND 2: 1

## 2024-07-02 NOTE — PROGRESS NOTES
"Subjective   Patient ID: Josi Mcneill is a 89 y.o. female who presents for Medication Check .    HPI   Patient canceled home health after last OV a month ago  No headache, chest pain, shortness of breath, lightheadedness, or edema  Dizziness at times (vertigo at night), sits up till moves, no falls, + fatigue and off balance  Having some RLS      Review of Systems   Constitutional:  Negative for activity change, appetite change and fatigue.   Respiratory:  Negative for cough, chest tightness and shortness of breath.    Cardiovascular:  Negative for chest pain, palpitations and leg swelling.   Gastrointestinal:  Negative for abdominal pain, constipation, diarrhea, nausea and vomiting.   Neurological:  Positive for dizziness and light-headedness. Negative for headaches.   Psychiatric/Behavioral:  Negative for dysphoric mood and sleep disturbance.        Objective   /62   Pulse 62   Ht 1.499 m (4' 11\")   Wt 62.3 kg (137 lb 6.4 oz)   SpO2 95%   BMI 27.75 kg/m²     Physical Exam  Vitals and nursing note reviewed.   Constitutional:       Appearance: Normal appearance.   HENT:      Head: Normocephalic and atraumatic.      Right Ear: Tympanic membrane, ear canal and external ear normal.      Left Ear: Tympanic membrane, ear canal and external ear normal.      Nose: Nose normal.      Mouth/Throat:      Mouth: Mucous membranes are moist.      Pharynx: Oropharynx is clear.   Cardiovascular:      Rate and Rhythm: Normal rate and regular rhythm.      Pulses: Normal pulses.      Heart sounds: Normal heart sounds.   Pulmonary:      Effort: Pulmonary effort is normal.      Breath sounds: Normal breath sounds.   Musculoskeletal:      Cervical back: Normal range of motion and neck supple.   Neurological:      Mental Status: She is alert.   Psychiatric:         Mood and Affect: Mood normal.         Behavior: Behavior normal.         Assessment/Plan   Problem List Items Addressed This Visit             ICD-10-CM    " Chronic renal impairment, stage 3 (moderate) (Multi) N18.30    Relevant Orders    Follow Up In Primary Care - Established    Fatigue R53.83    Relevant Orders    Follow Up In Primary Care - Established    Hyperlipemia E78.5    Relevant Orders    Follow Up In Primary Care - Established    Comprehensive Metabolic Panel    Lipid Panel    Hypertension - Primary I10     Blood pressure stable, no changes medication.         Relevant Orders    Follow Up In Primary Care - Established    Comprehensive Metabolic Panel    Dizziness R42    Relevant Orders    Follow Up In Primary Care - Established    Moderate dementia (Multi) F03.B0     Seems to have improved with donepezil, patient is convinced that she has some dizziness from this, patient's daughter claims that had been there before and that the improvement that she has seen since starting the medication is worth the risk of side effects.         Relevant Orders    Follow Up In Primary Care - Established

## 2024-07-02 NOTE — ASSESSMENT & PLAN NOTE
Seems to have improved with donepezil, patient is convinced that she has some dizziness from this, patient's daughter claims that had been there before and that the improvement that she has seen since starting the medication is worth the risk of side effects.

## 2024-07-31 ENCOUNTER — APPOINTMENT (OUTPATIENT)
Dept: PRIMARY CARE | Facility: CLINIC | Age: 89
End: 2024-07-31
Payer: MEDICARE

## 2024-08-19 ENCOUNTER — TELEPHONE (OUTPATIENT)
Dept: PRIMARY CARE | Facility: CLINIC | Age: 89
End: 2024-08-19
Payer: MEDICARE

## 2024-08-19 NOTE — TELEPHONE ENCOUNTER
She should increase fluids, everything that comes out needs to be replaced.  She could try over-the-counter Imodium or Kaopectate.

## 2024-08-19 NOTE — TELEPHONE ENCOUNTER
PT'S DAUGHTER CALLED, LMOM PATIENT HAS HAD DIARRHEA SINCE FRIDAY.    WHAT CAN SHE USE OTC ?     [phone]

## 2024-08-22 ENCOUNTER — LAB (OUTPATIENT)
Facility: LAB | Age: 89
End: 2024-08-22
Payer: MEDICARE

## 2024-08-22 ENCOUNTER — OFFICE VISIT (OUTPATIENT)
Age: 89
End: 2024-08-22
Payer: MEDICARE

## 2024-08-22 VITALS
WEIGHT: 123.6 LBS | OXYGEN SATURATION: 98 % | DIASTOLIC BLOOD PRESSURE: 68 MMHG | SYSTOLIC BLOOD PRESSURE: 128 MMHG | HEIGHT: 59 IN | HEART RATE: 85 BPM | BODY MASS INDEX: 24.92 KG/M2

## 2024-08-22 DIAGNOSIS — R10.84 GENERALIZED ABDOMINAL PAIN: ICD-10-CM

## 2024-08-22 DIAGNOSIS — R19.7 DIARRHEA, UNSPECIFIED TYPE: ICD-10-CM

## 2024-08-22 DIAGNOSIS — R10.84 GENERALIZED ABDOMINAL PAIN: Primary | ICD-10-CM

## 2024-08-22 LAB
ALBUMIN SERPL BCP-MCNC: 4.1 G/DL (ref 3.4–5)
ALP SERPL-CCNC: 92 U/L (ref 33–136)
ALT SERPL W P-5'-P-CCNC: 8 U/L (ref 7–45)
ANION GAP SERPL CALC-SCNC: 11 MMOL/L (ref 10–20)
AST SERPL W P-5'-P-CCNC: 17 U/L (ref 9–39)
BASOPHILS # BLD AUTO: 0.02 X10*3/UL (ref 0–0.1)
BASOPHILS NFR BLD AUTO: 0.3 %
BILIRUB SERPL-MCNC: 1.1 MG/DL (ref 0–1.2)
BUN SERPL-MCNC: 25 MG/DL (ref 6–23)
CALCIUM SERPL-MCNC: 9.3 MG/DL (ref 8.6–10.3)
CHLORIDE SERPL-SCNC: 100 MMOL/L (ref 98–107)
CO2 SERPL-SCNC: 30 MMOL/L (ref 21–32)
CREAT SERPL-MCNC: 1.24 MG/DL (ref 0.5–1.05)
CRP SERPL-MCNC: 2.16 MG/DL
EGFRCR SERPLBLD CKD-EPI 2021: 39 ML/MIN/1.73M*2
EOSINOPHIL # BLD AUTO: 0.03 X10*3/UL (ref 0–0.4)
EOSINOPHIL NFR BLD AUTO: 0.4 %
ERYTHROCYTE [DISTWIDTH] IN BLOOD BY AUTOMATED COUNT: 13.2 % (ref 11.5–14.5)
GLUCOSE SERPL-MCNC: 92 MG/DL (ref 74–99)
HCT VFR BLD AUTO: 40.9 % (ref 36–46)
HGB BLD-MCNC: 13.1 G/DL (ref 12–16)
IMM GRANULOCYTES # BLD AUTO: 0.06 X10*3/UL (ref 0–0.5)
IMM GRANULOCYTES NFR BLD AUTO: 0.8 % (ref 0–0.9)
LYMPHOCYTES # BLD AUTO: 1.57 X10*3/UL (ref 0.8–3)
LYMPHOCYTES NFR BLD AUTO: 21.2 %
MAGNESIUM SERPL-MCNC: 2.05 MG/DL (ref 1.6–2.4)
MCH RBC QN AUTO: 30.6 PG (ref 26–34)
MCHC RBC AUTO-ENTMCNC: 32 G/DL (ref 32–36)
MCV RBC AUTO: 96 FL (ref 80–100)
MONOCYTES # BLD AUTO: 0.75 X10*3/UL (ref 0.05–0.8)
MONOCYTES NFR BLD AUTO: 10.1 %
NEUTROPHILS # BLD AUTO: 4.97 X10*3/UL (ref 1.6–5.5)
NEUTROPHILS NFR BLD AUTO: 67.2 %
NRBC BLD-RTO: 0 /100 WBCS (ref 0–0)
PLATELET # BLD AUTO: 152 X10*3/UL (ref 150–450)
POTASSIUM SERPL-SCNC: 4.3 MMOL/L (ref 3.5–5.3)
PROT SERPL-MCNC: 6.2 G/DL (ref 6.4–8.2)
RBC # BLD AUTO: 4.28 X10*6/UL (ref 4–5.2)
SODIUM SERPL-SCNC: 137 MMOL/L (ref 136–145)
TSH SERPL-ACNC: 0.1 MIU/L (ref 0.44–3.98)
WBC # BLD AUTO: 7.4 X10*3/UL (ref 4.4–11.3)

## 2024-08-22 PROCEDURE — 84443 ASSAY THYROID STIM HORMONE: CPT

## 2024-08-22 PROCEDURE — 1036F TOBACCO NON-USER: CPT | Performed by: NURSE PRACTITIONER

## 2024-08-22 PROCEDURE — 36415 COLL VENOUS BLD VENIPUNCTURE: CPT

## 2024-08-22 PROCEDURE — 3078F DIAST BP <80 MM HG: CPT | Performed by: NURSE PRACTITIONER

## 2024-08-22 PROCEDURE — 1159F MED LIST DOCD IN RCRD: CPT | Performed by: NURSE PRACTITIONER

## 2024-08-22 PROCEDURE — 85025 COMPLETE CBC W/AUTO DIFF WBC: CPT

## 2024-08-22 PROCEDURE — 86140 C-REACTIVE PROTEIN: CPT

## 2024-08-22 PROCEDURE — 99214 OFFICE O/P EST MOD 30 MIN: CPT | Performed by: NURSE PRACTITIONER

## 2024-08-22 PROCEDURE — 83735 ASSAY OF MAGNESIUM: CPT

## 2024-08-22 PROCEDURE — 80053 COMPREHEN METABOLIC PANEL: CPT

## 2024-08-22 PROCEDURE — 1160F RVW MEDS BY RX/DR IN RCRD: CPT | Performed by: NURSE PRACTITIONER

## 2024-08-22 PROCEDURE — 3074F SYST BP LT 130 MM HG: CPT | Performed by: NURSE PRACTITIONER

## 2024-08-22 ASSESSMENT — ENCOUNTER SYMPTOMS
NAUSEA: 0
FEVER: 0
DIARRHEA: 1
ROS GI COMMENTS: + ABDOMINAL BLOATING
BLOOD IN STOOL: 0
VOMITING: 0
CHILLS: 1
APPETITE CHANGE: 1
RECTAL PAIN: 0
FATIGUE: 1
ABDOMINAL PAIN: 1
UNEXPECTED WEIGHT CHANGE: 1

## 2024-08-22 ASSESSMENT — PATIENT HEALTH QUESTIONNAIRE - PHQ9
SUM OF ALL RESPONSES TO PHQ9 QUESTIONS 1 AND 2: 0
1. LITTLE INTEREST OR PLEASURE IN DOING THINGS: NOT AT ALL
2. FEELING DOWN, DEPRESSED OR HOPELESS: NOT AT ALL

## 2024-08-22 NOTE — PROGRESS NOTES
"Subjective   Patient ID: Josi Mcneill is a 89 y.o. female who presents for Diarrhea (Intermittently x 1 week. Complains of abd pain that is relieved after BM).    [place] comes to the office, with her daughter, for concerns of diarrhea. Diarrhea started on Friday and she has been having approximately 2 liquid bowel movements daily.  She has started taking Imodium daily without a decrease in her bowel movement frequency.  She denies blood or mucus in her stool.  There is some abdominal bloating without nausea vomiting.  No recent ill exposures/hospitalizations/antibiotic use.  No nocturnal fecal incontinence.  Daughter endorses a poor appetite and a gradual weight loss over the past couple months.  Feels cold all the time  5# wt loss since .  Daughter indicates prior colonoscopies have been unremarkable.  No family history of colon cancer          Review of Systems   Constitutional:  Positive for appetite change, chills, fatigue and unexpected weight change. Negative for fever.   Gastrointestinal:  Positive for abdominal pain and diarrhea. Negative for blood in stool, nausea, rectal pain and vomiting.        + Abdominal bloating   Endocrine: Positive for cold intolerance.       Objective   /68   Pulse 85   Ht 1.499 m (4' 11\")   Wt 56.1 kg (123 lb 9.6 oz)   SpO2 98%   BMI 24.96 kg/m²     Physical Exam  Vitals reviewed.   Constitutional:       General: She is not in acute distress.     Appearance: She is not ill-appearing.   Neck:      Thyroid: No thyroid mass.   Cardiovascular:      Rate and Rhythm: Normal rate and regular rhythm.   Pulmonary:      Effort: Pulmonary effort is normal.      Breath sounds: Normal breath sounds.   Abdominal:      General: Abdomen is protuberant. Bowel sounds are increased.      Palpations: Abdomen is soft.      Tenderness: There is generalized abdominal tenderness.   Musculoskeletal:      Right lower le+ Edema present.      Left lower le+ Edema present. "   Neurological:      Mental Status: She is alert.         Assessment/Plan   Problem List Items Addressed This Visit             ICD-10-CM    Generalized abdominal pain - Primary R10.84    Relevant Orders    CT abdomen pelvis wo IV contrast    CBC and Auto Differential    Comprehensive Metabolic Panel    C-Reactive Protein    Magnesium    Thyroid Stimulating Hormone    Diarrhea R19.7    Relevant Orders    CT abdomen pelvis wo IV contrast    CBC and Auto Differential    Comprehensive Metabolic Panel    C-Reactive Protein    Magnesium    Thyroid Stimulating Hormone

## 2024-08-22 NOTE — RESULT ENCOUNTER NOTE
Please call and notify patient overall her blood work looks good except for she has a overly suppressed TSH.  I would like her to reduce her Synthroid to 25 mcg daily.  She will need to break her current dose of Synthroid into half and take that daily.  Blood work shows no signs of anemia

## 2024-08-22 NOTE — PATIENT INSTRUCTIONS
Please complete your blood work and we will schedule you for a CT of your abdomen and pelvis.    You may stop your Imodium and and continue on a brat diet.    If you develop blood in the stool, worsening abdominal pain, fever, nausea, or vomiting please go to the emergency room for further evaluation.

## 2024-08-23 ENCOUNTER — TELEPHONE (OUTPATIENT)
Age: 89
End: 2024-08-23
Payer: MEDICARE

## 2024-08-23 NOTE — TELEPHONE ENCOUNTER
----- Message from Airam Pierce sent at 8/22/2024  5:35 PM EDT -----  Please call and notify patient overall her blood work looks good except for she has a overly suppressed TSH.  I would like her to reduce her Synthroid to 25 mcg daily.  She will need to break her current dose of Synthroid into half and take that castillo  y.  Blood work shows no signs of anemia

## 2024-08-27 ENCOUNTER — HOSPITAL ENCOUNTER (OUTPATIENT)
Dept: RADIOLOGY | Facility: HOSPITAL | Age: 89
Discharge: HOME | End: 2024-08-27
Payer: MEDICARE

## 2024-08-27 DIAGNOSIS — R10.84 GENERALIZED ABDOMINAL PAIN: ICD-10-CM

## 2024-08-27 DIAGNOSIS — R19.7 DIARRHEA, UNSPECIFIED TYPE: ICD-10-CM

## 2024-08-27 PROCEDURE — A9698 NON-RAD CONTRAST MATERIALNOC: HCPCS | Performed by: NURSE PRACTITIONER

## 2024-08-27 PROCEDURE — 74176 CT ABD & PELVIS W/O CONTRAST: CPT

## 2024-08-27 PROCEDURE — 2550000001 HC RX 255 CONTRASTS: Performed by: NURSE PRACTITIONER

## 2024-08-29 ENCOUNTER — TELEPHONE (OUTPATIENT)
Age: 89
End: 2024-08-29
Payer: MEDICARE

## 2024-08-29 DIAGNOSIS — N94.89 ADNEXAL MASS: Primary | ICD-10-CM

## 2024-08-29 NOTE — RESULT ENCOUNTER NOTE
Called and reviewed results of CT of the abdomen and pelvis with patient's daughter  Will schedule a pelvic ultrasound  Add Metamucil 1/2 T daily  Phone update in 1 week-if still with bowel issues will refer to general surgery for further evaluation  Daughter aware and agreeable with plan

## 2024-08-29 NOTE — TELEPHONE ENCOUNTER
I did not order the test or see the patient for this problem.  Radiologist questions a lesion within the left pelvis that they were recommending a pelvic ultrasound for for further characterization.  She also has a small ventral hernia along the anterior abdominal wall that she appears to be a little bit improved since 2016.  She also has severe spinal stenosis of her lower back.

## 2024-08-30 ENCOUNTER — APPOINTMENT (OUTPATIENT)
Dept: RADIOLOGY | Facility: HOSPITAL | Age: 89
End: 2024-08-30
Payer: MEDICARE

## 2024-08-30 ENCOUNTER — TELEPHONE (OUTPATIENT)
Age: 89
End: 2024-08-30
Payer: MEDICARE

## 2024-08-30 NOTE — TELEPHONE ENCOUNTER
Estefani, patient's daughter (on HIPAA), said that [place] needs a little time to recover after having her CT done before she has the US done, they will call the patient to reschedule the appointment when they're ready.

## 2024-09-03 ENCOUNTER — APPOINTMENT (OUTPATIENT)
Dept: RADIOLOGY | Facility: HOSPITAL | Age: 89
End: 2024-09-03
Payer: MEDICARE

## 2024-10-01 DIAGNOSIS — I10 PRIMARY HYPERTENSION: ICD-10-CM

## 2024-10-04 ENCOUNTER — LAB (OUTPATIENT)
Facility: LAB | Age: 89
End: 2024-10-04
Payer: MEDICARE

## 2024-10-04 DIAGNOSIS — I10 PRIMARY HYPERTENSION: ICD-10-CM

## 2024-10-04 DIAGNOSIS — E78.2 MIXED HYPERLIPIDEMIA: ICD-10-CM

## 2024-10-04 DIAGNOSIS — E53.8 VITAMIN B12 DEFICIENCY: ICD-10-CM

## 2024-10-04 DIAGNOSIS — N18.32 CHRONIC RENAL IMPAIRMENT, STAGE 3B (MULTI): ICD-10-CM

## 2024-10-04 LAB
ALBUMIN SERPL BCP-MCNC: 4.2 G/DL (ref 3.4–5)
ALP SERPL-CCNC: 96 U/L (ref 33–136)
ALT SERPL W P-5'-P-CCNC: 10 U/L (ref 7–45)
ANION GAP SERPL CALC-SCNC: 11 MMOL/L (ref 10–20)
AST SERPL W P-5'-P-CCNC: 18 U/L (ref 9–39)
BASOPHILS # BLD AUTO: 0.03 X10*3/UL (ref 0–0.1)
BASOPHILS NFR BLD AUTO: 0.5 %
BILIRUB SERPL-MCNC: 0.8 MG/DL (ref 0–1.2)
BUN SERPL-MCNC: 28 MG/DL (ref 6–23)
CALCIUM SERPL-MCNC: 9 MG/DL (ref 8.6–10.3)
CHLORIDE SERPL-SCNC: 103 MMOL/L (ref 98–107)
CHOLEST SERPL-MCNC: 205 MG/DL (ref 0–199)
CHOLESTEROL/HDL RATIO: 2.3
CO2 SERPL-SCNC: 30 MMOL/L (ref 21–32)
CREAT SERPL-MCNC: 1.64 MG/DL (ref 0.5–1.05)
EGFRCR SERPLBLD CKD-EPI 2021: 28 ML/MIN/1.73M*2
EOSINOPHIL # BLD AUTO: 0.12 X10*3/UL (ref 0–0.4)
EOSINOPHIL NFR BLD AUTO: 1.9 %
ERYTHROCYTE [DISTWIDTH] IN BLOOD BY AUTOMATED COUNT: 13.7 % (ref 11.5–14.5)
GLUCOSE SERPL-MCNC: 96 MG/DL (ref 74–99)
HCT VFR BLD AUTO: 40.9 % (ref 36–46)
HDLC SERPL-MCNC: 91 MG/DL
HGB BLD-MCNC: 13.1 G/DL (ref 12–16)
IMM GRANULOCYTES # BLD AUTO: 0.02 X10*3/UL (ref 0–0.5)
IMM GRANULOCYTES NFR BLD AUTO: 0.3 % (ref 0–0.9)
LDLC SERPL CALC-MCNC: 97 MG/DL
LYMPHOCYTES # BLD AUTO: 1.94 X10*3/UL (ref 0.8–3)
LYMPHOCYTES NFR BLD AUTO: 30.6 %
MCH RBC QN AUTO: 30.8 PG (ref 26–34)
MCHC RBC AUTO-ENTMCNC: 32 G/DL (ref 32–36)
MCV RBC AUTO: 96 FL (ref 80–100)
MONOCYTES # BLD AUTO: 0.58 X10*3/UL (ref 0.05–0.8)
MONOCYTES NFR BLD AUTO: 9.1 %
NEUTROPHILS # BLD AUTO: 3.65 X10*3/UL (ref 1.6–5.5)
NEUTROPHILS NFR BLD AUTO: 57.6 %
NON HDL CHOLESTEROL: 114 MG/DL (ref 0–149)
NRBC BLD-RTO: 0 /100 WBCS (ref 0–0)
PLATELET # BLD AUTO: 188 X10*3/UL (ref 150–450)
POTASSIUM SERPL-SCNC: 4.5 MMOL/L (ref 3.5–5.3)
PROT SERPL-MCNC: 6.2 G/DL (ref 6.4–8.2)
RBC # BLD AUTO: 4.25 X10*6/UL (ref 4–5.2)
SODIUM SERPL-SCNC: 139 MMOL/L (ref 136–145)
TRIGL SERPL-MCNC: 85 MG/DL (ref 0–149)
VIT B12 SERPL-MCNC: 395 PG/ML (ref 211–911)
VLDL: 17 MG/DL (ref 0–40)
WBC # BLD AUTO: 6.3 X10*3/UL (ref 4.4–11.3)

## 2024-10-04 PROCEDURE — 85025 COMPLETE CBC W/AUTO DIFF WBC: CPT

## 2024-10-04 PROCEDURE — 80061 LIPID PANEL: CPT

## 2024-10-04 PROCEDURE — 36415 COLL VENOUS BLD VENIPUNCTURE: CPT

## 2024-10-04 PROCEDURE — 80053 COMPREHEN METABOLIC PANEL: CPT

## 2024-10-04 PROCEDURE — 82607 VITAMIN B-12: CPT

## 2024-10-08 ENCOUNTER — APPOINTMENT (OUTPATIENT)
Dept: PRIMARY CARE | Facility: CLINIC | Age: 89
End: 2024-10-08
Payer: MEDICARE

## 2024-10-10 ENCOUNTER — APPOINTMENT (OUTPATIENT)
Age: 89
End: 2024-10-10
Payer: MEDICARE

## 2024-10-10 VITALS
SYSTOLIC BLOOD PRESSURE: 124 MMHG | DIASTOLIC BLOOD PRESSURE: 60 MMHG | WEIGHT: 121.8 LBS | BODY MASS INDEX: 24.56 KG/M2 | OXYGEN SATURATION: 96 % | HEIGHT: 59 IN | HEART RATE: 65 BPM

## 2024-10-10 DIAGNOSIS — F03.B0 MODERATE DEMENTIA, UNSPECIFIED DEMENTIA TYPE, UNSPECIFIED WHETHER BEHAVIORAL, PSYCHOTIC, OR MOOD DISTURBANCE OR ANXIETY: ICD-10-CM

## 2024-10-10 DIAGNOSIS — I10 PRIMARY HYPERTENSION: ICD-10-CM

## 2024-10-10 DIAGNOSIS — E78.2 MIXED HYPERLIPIDEMIA: ICD-10-CM

## 2024-10-10 DIAGNOSIS — Z00.00 ROUTINE GENERAL MEDICAL EXAMINATION AT HEALTH CARE FACILITY: Primary | ICD-10-CM

## 2024-10-10 DIAGNOSIS — E03.9 ACQUIRED HYPOTHYROIDISM: ICD-10-CM

## 2024-10-10 DIAGNOSIS — F01.B0 MODERATE VASCULAR DEMENTIA WITHOUT BEHAVIORAL DISTURBANCE, PSYCHOTIC DISTURBANCE, MOOD DISTURBANCE, OR ANXIETY: ICD-10-CM

## 2024-10-10 DIAGNOSIS — R19.7 DIARRHEA, UNSPECIFIED TYPE: ICD-10-CM

## 2024-10-10 DIAGNOSIS — N18.32 CHRONIC RENAL IMPAIRMENT, STAGE 3B (MULTI): ICD-10-CM

## 2024-10-10 DIAGNOSIS — R42 DIZZINESS: ICD-10-CM

## 2024-10-10 DIAGNOSIS — R53.82 CHRONIC FATIGUE: ICD-10-CM

## 2024-10-10 PROCEDURE — G0439 PPPS, SUBSEQ VISIT: HCPCS | Performed by: FAMILY MEDICINE

## 2024-10-10 PROCEDURE — 3078F DIAST BP <80 MM HG: CPT | Performed by: FAMILY MEDICINE

## 2024-10-10 PROCEDURE — 1123F ACP DISCUSS/DSCN MKR DOCD: CPT | Performed by: FAMILY MEDICINE

## 2024-10-10 PROCEDURE — 1159F MED LIST DOCD IN RCRD: CPT | Performed by: FAMILY MEDICINE

## 2024-10-10 PROCEDURE — 99214 OFFICE O/P EST MOD 30 MIN: CPT | Performed by: FAMILY MEDICINE

## 2024-10-10 PROCEDURE — 1160F RVW MEDS BY RX/DR IN RCRD: CPT | Performed by: FAMILY MEDICINE

## 2024-10-10 PROCEDURE — 1158F ADVNC CARE PLAN TLK DOCD: CPT | Performed by: FAMILY MEDICINE

## 2024-10-10 PROCEDURE — 3074F SYST BP LT 130 MM HG: CPT | Performed by: FAMILY MEDICINE

## 2024-10-10 PROCEDURE — 1170F FXNL STATUS ASSESSED: CPT | Performed by: FAMILY MEDICINE

## 2024-10-10 PROCEDURE — 1036F TOBACCO NON-USER: CPT | Performed by: FAMILY MEDICINE

## 2024-10-10 RX ORDER — LEVOTHYROXINE SODIUM 50 UG/1
TABLET ORAL
Qty: 90 TABLET | Refills: 3 | Status: SHIPPED | OUTPATIENT
Start: 2024-10-10

## 2024-10-10 RX ORDER — METOPROLOL SUCCINATE 50 MG/1
50 TABLET, EXTENDED RELEASE ORAL DAILY
Qty: 90 TABLET | Refills: 3 | Status: SHIPPED | OUTPATIENT
Start: 2024-10-10 | End: 2025-10-10

## 2024-10-10 RX ORDER — POTASSIUM CHLORIDE 750 MG/1
10 TABLET, FILM COATED, EXTENDED RELEASE ORAL 2 TIMES DAILY
Qty: 180 TABLET | Refills: 3 | Status: CANCELLED | OUTPATIENT
Start: 2024-10-10 | End: 2025-10-10

## 2024-10-10 RX ORDER — LOSARTAN POTASSIUM 100 MG/1
100 TABLET ORAL DAILY
Qty: 90 TABLET | Refills: 3 | Status: SHIPPED | OUTPATIENT
Start: 2024-10-10 | End: 2025-10-10

## 2024-10-10 RX ORDER — METOPROLOL SUCCINATE 50 MG/1
50 TABLET, EXTENDED RELEASE ORAL DAILY
Qty: 90 TABLET | Refills: 0 | OUTPATIENT
Start: 2024-10-10

## 2024-10-10 RX ORDER — DONEPEZIL HYDROCHLORIDE 10 MG/1
TABLET, FILM COATED ORAL
Qty: 90 TABLET | Refills: 3 | Status: SHIPPED | OUTPATIENT
Start: 2024-10-10

## 2024-10-10 ASSESSMENT — PATIENT HEALTH QUESTIONNAIRE - PHQ9
1. LITTLE INTEREST OR PLEASURE IN DOING THINGS: NOT AT ALL
SUM OF ALL RESPONSES TO PHQ9 QUESTIONS 1 AND 2: 1
10. IF YOU CHECKED OFF ANY PROBLEMS, HOW DIFFICULT HAVE THESE PROBLEMS MADE IT FOR YOU TO DO YOUR WORK, TAKE CARE OF THINGS AT HOME, OR GET ALONG WITH OTHER PEOPLE: SOMEWHAT DIFFICULT
2. FEELING DOWN, DEPRESSED OR HOPELESS: SEVERAL DAYS

## 2024-10-10 ASSESSMENT — ENCOUNTER SYMPTOMS
ABDOMINAL DISTENTION: 0
HEADACHES: 0
COUGH: 0
LIGHT-HEADEDNESS: 0
DIZZINESS: 1
SHORTNESS OF BREATH: 0
NUMBNESS: 0
PALPITATIONS: 0
SLEEP DISTURBANCE: 0
CONSTIPATION: 0
NAUSEA: 0
DIFFICULTY URINATING: 0
VOMITING: 0
RHINORRHEA: 0
ABDOMINAL PAIN: 0
BACK PAIN: 1
WHEEZING: 0
UNEXPECTED WEIGHT CHANGE: 0
LOSS OF SENSATION IN FEET: 0
DIARRHEA: 0
APPETITE CHANGE: 0
TROUBLE SWALLOWING: 0
ACTIVITY CHANGE: 0
FATIGUE: 1
ADENOPATHY: 0
OCCASIONAL FEELINGS OF UNSTEADINESS: 0
DYSPHORIC MOOD: 0
ARTHRALGIAS: 0
NERVOUS/ANXIOUS: 0
DEPRESSION: 1

## 2024-10-10 ASSESSMENT — ACTIVITIES OF DAILY LIVING (ADL)
TAKING_MEDICATION: NEEDS ASSISTANCE
MANAGING_FINANCES: NEEDS ASSISTANCE
GROCERY_SHOPPING: NEEDS ASSISTANCE
DRESSING: INDEPENDENT
BATHING: INDEPENDENT
DOING_HOUSEWORK: NEEDS ASSISTANCE

## 2024-10-10 NOTE — ASSESSMENT & PLAN NOTE
Taking half of a dose of levothyroxine currently, last TSH was low, recheck at follow-up appointment.  Orders:    levothyroxine (Synthroid, Levoxyl) 50 mcg tablet; 1/2 a day    TSH with reflex to Free T4 if abnormal; Future    Follow Up In Primary Care - Established; Future

## 2024-10-10 NOTE — PROGRESS NOTES
"Subjective   Reason for Visit: Josi Mcneill is an 89 y.o. female here for a Medicare Wellness visit.     Past Medical, Surgical, and Family History reviewed and updated in chart.    Reviewed all medications by prescribing practitioner or clinical pharmacist (such as prescriptions, OTCs, herbal therapies and supplements) and documented in the medical record.    HPI  No headache, chest pain, shortness of breath, dizziness, lightheadedness, or edema  Dizziness at night, no falls, + fatigue  Loose stools, taking metamucil, BM after eating, no accidents, + gas and bloating, metamucil not really helping  Back pain OK right now  ttak  in  Patient Care Team:  Luis Batres MD as PCP - General (Family Medicine)  Luis Batres MD as PCP - [facility] Attributed Provider     Review of Systems   Constitutional:  Positive for fatigue. Negative for activity change, appetite change and unexpected weight change.   HENT:  Negative for ear pain, nosebleeds, rhinorrhea, sneezing and trouble swallowing.    Respiratory:  Negative for cough, shortness of breath and wheezing.    Cardiovascular:  Negative for chest pain, palpitations and leg swelling.   Gastrointestinal:  Negative for abdominal distention, abdominal pain, constipation, diarrhea, nausea and vomiting.   Genitourinary:  Negative for difficulty urinating.   Musculoskeletal:  Positive for back pain. Negative for arthralgias and gait problem.   Skin:  Negative for rash.   Neurological:  Positive for dizziness. Negative for light-headedness, numbness and headaches.   Hematological:  Negative for adenopathy.   Psychiatric/Behavioral:  Negative for behavioral problems, dysphoric mood and sleep disturbance. The patient is not nervous/anxious.    All other systems reviewed and are negative.      Objective   Vitals:  /60   Pulse 65   Ht 1.499 m (4' 11\")   Wt 55.2 kg (121 lb 12.8 oz)   SpO2 96%   BMI 24.60 kg/m²       Physical Exam  Vitals and nursing note " reviewed.   Constitutional:       Appearance: Normal appearance.   HENT:      Head: Normocephalic and atraumatic.      Right Ear: Tympanic membrane, ear canal and external ear normal.      Left Ear: Tympanic membrane, ear canal and external ear normal.      Nose: Nose normal.      Mouth/Throat:      Mouth: Mucous membranes are moist.      Pharynx: Oropharynx is clear.   Cardiovascular:      Rate and Rhythm: Normal rate and regular rhythm.      Pulses: Normal pulses.      Heart sounds: Normal heart sounds.   Pulmonary:      Effort: Pulmonary effort is normal.      Breath sounds: Normal breath sounds.   Musculoskeletal:      Cervical back: Normal range of motion and neck supple.   Neurological:      Mental Status: She is alert.   Psychiatric:         Mood and Affect: Mood normal.         Behavior: Behavior normal.         Assessment & Plan  Mixed hyperlipidemia    Orders:    Follow Up In Primary Care - [facility]    Follow Up In Primary Care - [facility]; Future    Primary hypertension  Try discontinuing hydrochlorothiazide and potassium, recheck in 1 month with electrolyte profile, renal function down slightly from prior, blood pressure under good control.  Orders:    Follow Up In Primary Care - [facility]    metoprolol succinate XL (Toprol-XL) 50 mg 24 hr tablet; Take 1 tablet (50 mg) by mouth once daily.    Comprehensive Metabolic Panel; Future    losartan (Cozaar) 100 mg tablet; Take 1 tablet (100 mg) by mouth once daily.    Follow Up In Primary Care - [facility]; Future    Chronic renal impairment, stage 3b (Multi)  Try discontinuing hydrochlorothiazide, recheck at follow-up.  Orders:    Follow Up In Primary Care - [facility]    Follow Up In Primary Care - Established; Future    Moderate vascular dementia without behavioral disturbance, psychotic disturbance, mood disturbance, or anxiety    Orders:    Follow Up In Primary Care - [facility]    Follow Up In [facility];  Future    Dizziness    Orders:    Follow Up In Primary Care - [facility]    Follow Up In Primary Care [facility]; Future    Chronic fatigue    Orders:    Follow Up In Primary Care - [facility]    Follow Up In Primary Care - [facility]; Future    Routine general medical examination at health care facility    Orders:    Follow Up In Primary Care - [facility]; Future    Acquired hypothyroidism  Taking half of a dose of levothyroxine currently, last TSH was low, recheck at follow-up appointment.  Orders:    levothyroxine (Synthroid, Levoxyl) 50 mcg tablet; 1/2 a day    TSH with reflex to Free T4 if abnormal; Future    Follow Up In Primary Care - [facility]; Future    Moderate dementia, unspecified dementia type, unspecified whether behavioral, psychotic, or mood disturbance or anxiety  Tolerating donepezil but family given it to her every other day due to dizziness.  Orders:    donepezil (Aricept) 10 mg tablet; Take every other day    Follow Up In Primary Care [facility]; Future    Diarrhea, unspecified type  Advised to continue with fiber, increase fluids, discontinue over-the-counter magnesium.

## 2024-10-10 NOTE — ASSESSMENT & PLAN NOTE
Try discontinuing hydrochlorothiazide, recheck at follow-up.  Orders:    Follow Up In Primary Care - Established    Follow Up In Primary Care - Established; Future

## 2024-10-10 NOTE — ASSESSMENT & PLAN NOTE
Orders:    Follow Up In Primary Care - Established    Follow Up In Primary Care - Established; Future

## 2024-10-10 NOTE — ASSESSMENT & PLAN NOTE
Tolerating donepezil but family given it to her every other day due to dizziness.  Orders:    donepezil (Aricept) 10 mg tablet; Take every other day    Follow Up In Primary Care - Established; Future

## 2024-10-10 NOTE — ASSESSMENT & PLAN NOTE
Try discontinuing hydrochlorothiazide and potassium, recheck in 1 month with electrolyte profile, renal function down slightly from prior, blood pressure under good control.  Orders:    Follow Up In Primary Care - Established    metoprolol succinate XL (Toprol-XL) 50 mg 24 hr tablet; Take 1 tablet (50 mg) by mouth once daily.    Comprehensive Metabolic Panel; Future    losartan (Cozaar) 100 mg tablet; Take 1 tablet (100 mg) by mouth once daily.    Follow Up In Primary Care - Established; Future

## 2024-11-04 ENCOUNTER — LAB (OUTPATIENT)
Facility: LAB | Age: 89
End: 2024-11-04
Payer: MEDICARE

## 2024-11-04 DIAGNOSIS — E03.9 ACQUIRED HYPOTHYROIDISM: ICD-10-CM

## 2024-11-04 DIAGNOSIS — I10 PRIMARY HYPERTENSION: ICD-10-CM

## 2024-11-04 LAB
ALBUMIN SERPL BCP-MCNC: 4.3 G/DL (ref 3.4–5)
ALP SERPL-CCNC: 90 U/L (ref 33–136)
ALT SERPL W P-5'-P-CCNC: 14 U/L (ref 7–45)
ANION GAP SERPL CALC-SCNC: 13 MMOL/L (ref 10–20)
AST SERPL W P-5'-P-CCNC: 19 U/L (ref 9–39)
BILIRUB SERPL-MCNC: 0.8 MG/DL (ref 0–1.2)
BUN SERPL-MCNC: 24 MG/DL (ref 6–23)
CALCIUM SERPL-MCNC: 9.3 MG/DL (ref 8.6–10.3)
CHLORIDE SERPL-SCNC: 105 MMOL/L (ref 98–107)
CO2 SERPL-SCNC: 27 MMOL/L (ref 21–32)
CREAT SERPL-MCNC: 1.26 MG/DL (ref 0.5–1.05)
EGFRCR SERPLBLD CKD-EPI 2021: 38 ML/MIN/1.73M*2
GLUCOSE SERPL-MCNC: 93 MG/DL (ref 74–99)
POTASSIUM SERPL-SCNC: 3.8 MMOL/L (ref 3.5–5.3)
PROT SERPL-MCNC: 6.5 G/DL (ref 6.4–8.2)
SODIUM SERPL-SCNC: 141 MMOL/L (ref 136–145)
T4 FREE SERPL-MCNC: 1.48 NG/DL (ref 0.61–1.12)
TSH SERPL-ACNC: 4.15 MIU/L (ref 0.44–3.98)

## 2024-11-04 PROCEDURE — 80053 COMPREHEN METABOLIC PANEL: CPT

## 2024-11-04 PROCEDURE — 84439 ASSAY OF FREE THYROXINE: CPT

## 2024-11-04 PROCEDURE — 36415 COLL VENOUS BLD VENIPUNCTURE: CPT

## 2024-11-04 PROCEDURE — 84443 ASSAY THYROID STIM HORMONE: CPT

## 2024-11-11 ENCOUNTER — APPOINTMENT (OUTPATIENT)
Age: 89
End: 2024-11-11
Payer: MEDICARE

## 2024-11-11 VITALS
OXYGEN SATURATION: 96 % | BODY MASS INDEX: 25.6 KG/M2 | WEIGHT: 127 LBS | HEIGHT: 59 IN | HEART RATE: 83 BPM | SYSTOLIC BLOOD PRESSURE: 160 MMHG | DIASTOLIC BLOOD PRESSURE: 70 MMHG

## 2024-11-11 DIAGNOSIS — F01.B0 MODERATE VASCULAR DEMENTIA WITHOUT BEHAVIORAL DISTURBANCE, PSYCHOTIC DISTURBANCE, MOOD DISTURBANCE, OR ANXIETY: ICD-10-CM

## 2024-11-11 DIAGNOSIS — I10 PRIMARY HYPERTENSION: ICD-10-CM

## 2024-11-11 DIAGNOSIS — E03.9 ACQUIRED HYPOTHYROIDISM: ICD-10-CM

## 2024-11-11 DIAGNOSIS — E78.2 MIXED HYPERLIPIDEMIA: ICD-10-CM

## 2024-11-11 DIAGNOSIS — J31.0 CHRONIC RHINITIS: ICD-10-CM

## 2024-11-11 DIAGNOSIS — M19.90 OSTEOARTHRITIS, UNSPECIFIED OSTEOARTHRITIS TYPE, UNSPECIFIED SITE: ICD-10-CM

## 2024-11-11 DIAGNOSIS — N18.32 CHRONIC RENAL IMPAIRMENT, STAGE 3B (MULTI): ICD-10-CM

## 2024-11-11 PROCEDURE — 3077F SYST BP >= 140 MM HG: CPT | Performed by: FAMILY MEDICINE

## 2024-11-11 PROCEDURE — 1160F RVW MEDS BY RX/DR IN RCRD: CPT | Performed by: FAMILY MEDICINE

## 2024-11-11 PROCEDURE — 1159F MED LIST DOCD IN RCRD: CPT | Performed by: FAMILY MEDICINE

## 2024-11-11 PROCEDURE — 99214 OFFICE O/P EST MOD 30 MIN: CPT | Performed by: FAMILY MEDICINE

## 2024-11-11 PROCEDURE — 1036F TOBACCO NON-USER: CPT | Performed by: FAMILY MEDICINE

## 2024-11-11 PROCEDURE — 3078F DIAST BP <80 MM HG: CPT | Performed by: FAMILY MEDICINE

## 2024-11-11 PROCEDURE — G2211 COMPLEX E/M VISIT ADD ON: HCPCS | Performed by: FAMILY MEDICINE

## 2024-11-11 RX ORDER — FELODIPINE 10 MG/1
10 TABLET, EXTENDED RELEASE ORAL DAILY
Qty: 90 TABLET | Refills: 3 | Status: SHIPPED | OUTPATIENT
Start: 2024-11-11 | End: 2025-11-11

## 2024-11-11 RX ORDER — MELOXICAM 7.5 MG/1
7.5 TABLET ORAL DAILY PRN
Qty: 90 TABLET | Refills: 3 | Status: SHIPPED | OUTPATIENT
Start: 2024-11-11 | End: 2025-11-11

## 2024-11-11 RX ORDER — ATORVASTATIN CALCIUM 20 MG/1
20 TABLET, FILM COATED ORAL DAILY
Qty: 90 TABLET | Refills: 3 | Status: SHIPPED | OUTPATIENT
Start: 2024-11-11 | End: 2025-11-11

## 2024-11-11 RX ORDER — FLUTICASONE PROPIONATE 50 MCG
1 SPRAY, SUSPENSION (ML) NASAL DAILY
Qty: 16 G | Refills: 3 | Status: SHIPPED | OUTPATIENT
Start: 2024-11-11 | End: 2025-11-11

## 2024-11-11 RX ORDER — SPIRONOLACTONE 25 MG/1
12.5 TABLET ORAL DAILY
Qty: 15 TABLET | Refills: 11 | Status: SHIPPED | OUTPATIENT
Start: 2024-11-11 | End: 2025-11-11

## 2024-11-11 ASSESSMENT — ENCOUNTER SYMPTOMS
COUGH: 0
CHEST TIGHTNESS: 0
FATIGUE: 1
NAUSEA: 0
SHORTNESS OF BREATH: 0
PALPITATIONS: 0
ABDOMINAL PAIN: 0
APPETITE CHANGE: 0
VOMITING: 0
CONSTIPATION: 0
DIARRHEA: 0
ACTIVITY CHANGE: 0

## 2024-11-11 NOTE — PROGRESS NOTES
"Subjective   Patient ID: Josi Mcneill is a 89 y.o. female who presents for 1 MO LABS.    HPI   No headache, chest pain, shortness of breath, dizziness, lightheadedness, + edema  No HBP checks  More fatigued and swollen since last seen  Using meloxicam daily    Review of Systems   Constitutional:  Positive for fatigue. Negative for activity change and appetite change.   Respiratory:  Negative for cough, chest tightness and shortness of breath.    Cardiovascular:  Positive for leg swelling. Negative for chest pain and palpitations.   Gastrointestinal:  Negative for abdominal pain, constipation, diarrhea, nausea and vomiting.       Objective   /70   Pulse 83   Ht 1.499 m (4' 11\")   Wt 57.6 kg (127 lb)   SpO2 96%   BMI 25.65 kg/m²     Physical Exam  Vitals and nursing note reviewed.   Constitutional:       Appearance: Normal appearance.   HENT:      Head: Normocephalic and atraumatic.      Right Ear: Tympanic membrane, ear canal and external ear normal.      Left Ear: Tympanic membrane, ear canal and external ear normal.      Nose: Nose normal.      Mouth/Throat:      Mouth: Mucous membranes are moist.      Pharynx: Oropharynx is clear.   Cardiovascular:      Rate and Rhythm: Normal rate and regular rhythm.      Pulses: Normal pulses.      Heart sounds: Normal heart sounds.      Comments: +2 edema bilateral ankles.  Pulmonary:      Effort: Pulmonary effort is normal.      Breath sounds: Normal breath sounds.   Musculoskeletal:      Cervical back: Normal range of motion and neck supple.   Neurological:      Mental Status: She is alert.   Psychiatric:         Mood and Affect: Mood normal.         Behavior: Behavior normal.         Assessment/Plan   Problem List Items Addressed This Visit             ICD-10-CM    Chronic renal impairment, stage 3 (moderate) (Multi) N18.30    Relevant Orders    Follow Up In Primary Care - Established    Hyperlipemia E78.5    Relevant Medications    atorvastatin (Lipitor) " 20 mg tablet    Other Relevant Orders    Follow Up In Primary Care - Established    Hypertension I10     Blood pressure back up off of hydrochlorothiazide, patient complaining about ankle swelling, but renal function has improved and urination has also improved.  Will try low-dose spironolactone 12-1/2 mg once a day recheck again in 1 month.         Relevant Medications    felodipine ER (Plendil) 10 mg 24 hr tablet    spironolactone (Aldactone) 25 mg tablet    Other Relevant Orders    Basic Metabolic Panel    Follow Up In Primary Care - Established    Hypothyroid E03.9     TSH up to 4, continue with current medication and will continue to follow.         Relevant Orders    TSH with reflex to Free T4 if abnormal    Follow Up In Primary Care - Established    Moderate dementia F03.B0    Relevant Orders    Follow Up In Primary Care - Established     Other Visit Diagnoses         Codes    Chronic rhinitis     J31.0    Relevant Medications    fluticasone (Flonase) 50 mcg/actuation nasal spray    Other Relevant Orders    Follow Up In Primary Care - Established    Osteoarthritis, unspecified osteoarthritis type, unspecified site     M19.90    Relevant Medications    meloxicam (Mobic) 7.5 mg tablet    Other Relevant Orders    Follow Up In Primary Care - Established

## 2024-11-11 NOTE — PATIENT INSTRUCTIONS
Take a whole levothyroxine on weekends and continue 1/2 during the week. Start 1/2 spironolactone a day

## 2024-11-11 NOTE — ASSESSMENT & PLAN NOTE
Blood pressure back up off of hydrochlorothiazide, patient complaining about ankle swelling, but renal function has improved and urination has also improved.  Will try low-dose spironolactone 12-1/2 mg once a day recheck again in 1 month.

## 2024-12-03 ENCOUNTER — LAB (OUTPATIENT)
Facility: LAB | Age: 89
End: 2024-12-03
Payer: MEDICARE

## 2024-12-03 DIAGNOSIS — E03.9 ACQUIRED HYPOTHYROIDISM: ICD-10-CM

## 2024-12-03 DIAGNOSIS — I10 PRIMARY HYPERTENSION: ICD-10-CM

## 2024-12-03 LAB
ANION GAP SERPL CALC-SCNC: 10 MMOL/L (ref 10–20)
BUN SERPL-MCNC: 21 MG/DL (ref 6–23)
CALCIUM SERPL-MCNC: 9.5 MG/DL (ref 8.6–10.3)
CHLORIDE SERPL-SCNC: 104 MMOL/L (ref 98–107)
CO2 SERPL-SCNC: 31 MMOL/L (ref 21–32)
CREAT SERPL-MCNC: 1.28 MG/DL (ref 0.5–1.05)
EGFRCR SERPLBLD CKD-EPI 2021: 37 ML/MIN/1.73M*2
GLUCOSE SERPL-MCNC: 95 MG/DL (ref 74–99)
POTASSIUM SERPL-SCNC: 3.9 MMOL/L (ref 3.5–5.3)
SODIUM SERPL-SCNC: 141 MMOL/L (ref 136–145)
TSH SERPL-ACNC: 3.68 MIU/L (ref 0.44–3.98)

## 2024-12-03 PROCEDURE — 80048 BASIC METABOLIC PNL TOTAL CA: CPT

## 2024-12-03 PROCEDURE — 36415 COLL VENOUS BLD VENIPUNCTURE: CPT

## 2024-12-03 PROCEDURE — 84443 ASSAY THYROID STIM HORMONE: CPT

## 2024-12-11 ENCOUNTER — TELEPHONE (OUTPATIENT)
Age: 89
End: 2024-12-11

## 2024-12-11 ENCOUNTER — HOSPITAL ENCOUNTER (OUTPATIENT)
Dept: RADIOLOGY | Facility: HOSPITAL | Age: 89
Discharge: HOME | End: 2024-12-11
Payer: MEDICARE

## 2024-12-11 ENCOUNTER — APPOINTMENT (OUTPATIENT)
Age: 89
End: 2024-12-11
Payer: MEDICARE

## 2024-12-11 VITALS
HEART RATE: 74 BPM | WEIGHT: 119.2 LBS | HEIGHT: 59 IN | SYSTOLIC BLOOD PRESSURE: 140 MMHG | DIASTOLIC BLOOD PRESSURE: 70 MMHG | BODY MASS INDEX: 24.03 KG/M2 | OXYGEN SATURATION: 98 %

## 2024-12-11 DIAGNOSIS — F01.B2 MODERATE VASCULAR DEMENTIA WITH PSYCHOTIC DISTURBANCE: Primary | ICD-10-CM

## 2024-12-11 DIAGNOSIS — I10 PRIMARY HYPERTENSION: Primary | ICD-10-CM

## 2024-12-11 DIAGNOSIS — E03.9 ACQUIRED HYPOTHYROIDISM: ICD-10-CM

## 2024-12-11 DIAGNOSIS — N18.32 CHRONIC RENAL IMPAIRMENT, STAGE 3B (MULTI): ICD-10-CM

## 2024-12-11 DIAGNOSIS — M79.671 RIGHT FOOT PAIN: ICD-10-CM

## 2024-12-11 DIAGNOSIS — F01.B0 MODERATE VASCULAR DEMENTIA WITHOUT BEHAVIORAL DISTURBANCE, PSYCHOTIC DISTURBANCE, MOOD DISTURBANCE, OR ANXIETY: ICD-10-CM

## 2024-12-11 DIAGNOSIS — M47.27 OSTEOARTHRITIS OF SPINE WITH RADICULOPATHY, LUMBOSACRAL REGION: ICD-10-CM

## 2024-12-11 DIAGNOSIS — M19.90 OSTEOARTHRITIS, UNSPECIFIED OSTEOARTHRITIS TYPE, UNSPECIFIED SITE: ICD-10-CM

## 2024-12-11 DIAGNOSIS — J31.0 CHRONIC RHINITIS: ICD-10-CM

## 2024-12-11 DIAGNOSIS — E78.2 MIXED HYPERLIPIDEMIA: ICD-10-CM

## 2024-12-11 PROCEDURE — 99214 OFFICE O/P EST MOD 30 MIN: CPT | Performed by: FAMILY MEDICINE

## 2024-12-11 PROCEDURE — 73620 X-RAY EXAM OF FOOT: CPT | Mod: RIGHT SIDE | Performed by: RADIOLOGY

## 2024-12-11 PROCEDURE — 3077F SYST BP >= 140 MM HG: CPT | Performed by: FAMILY MEDICINE

## 2024-12-11 PROCEDURE — 3078F DIAST BP <80 MM HG: CPT | Performed by: FAMILY MEDICINE

## 2024-12-11 PROCEDURE — 1160F RVW MEDS BY RX/DR IN RCRD: CPT | Performed by: FAMILY MEDICINE

## 2024-12-11 PROCEDURE — 1036F TOBACCO NON-USER: CPT | Performed by: FAMILY MEDICINE

## 2024-12-11 PROCEDURE — G2211 COMPLEX E/M VISIT ADD ON: HCPCS | Performed by: FAMILY MEDICINE

## 2024-12-11 PROCEDURE — 73620 X-RAY EXAM OF FOOT: CPT | Mod: RT

## 2024-12-11 PROCEDURE — 1159F MED LIST DOCD IN RCRD: CPT | Performed by: FAMILY MEDICINE

## 2024-12-11 ASSESSMENT — ENCOUNTER SYMPTOMS
CONSTIPATION: 0
NAUSEA: 0
SHORTNESS OF BREATH: 0
LIGHT-HEADEDNESS: 1
COUGH: 0
PALPITATIONS: 0
ACTIVITY CHANGE: 0
LOSS OF SENSATION IN FEET: 0
DEPRESSION: 0
OCCASIONAL FEELINGS OF UNSTEADINESS: 0
CHEST TIGHTNESS: 0
VOMITING: 0
DIARRHEA: 0
APPETITE CHANGE: 0
DIZZINESS: 1
ABDOMINAL PAIN: 0
FATIGUE: 0

## 2024-12-11 NOTE — TELEPHONE ENCOUNTER
Patient is having more trouble ambulating and getting around, has had a recent fall.  Daughter is requesting home health services, can we place an order for a nurse and physical therapist for evaluation?

## 2024-12-11 NOTE — ASSESSMENT & PLAN NOTE
Blood pressure improved, renal function stable, potassium stable, patient is complaining of persistent dizziness, not sure if this is related to blood pressure medication or not.  She has had chronic dizziness.

## 2024-12-11 NOTE — PROGRESS NOTES
"Subjective   Patient ID: Josi Mcneill is a 89 y.o. female who presents for 1 MO LABS.    HPI   No headache, chest pain, shortness of breath, dizziness, lightheadedness, or edema  Maybe more unsteady since last seen  Fell last week, hurt right foot/toe    Review of Systems   Constitutional:  Negative for activity change, appetite change and fatigue.   Respiratory:  Negative for cough, chest tightness and shortness of breath.    Cardiovascular:  Negative for chest pain, palpitations and leg swelling.   Gastrointestinal:  Negative for abdominal pain, constipation, diarrhea, nausea and vomiting.   Musculoskeletal:  Positive for gait problem.   Neurological:  Positive for dizziness and light-headedness.       Objective   /70   Pulse 74   Ht 1.499 m (4' 11\")   Wt 54.1 kg (119 lb 3.2 oz)   SpO2 98%   BMI 24.08 kg/m²     Physical Exam  Vitals and nursing note reviewed.   Constitutional:       Appearance: Normal appearance.   HENT:      Head: Normocephalic and atraumatic.      Right Ear: Tympanic membrane, ear canal and external ear normal.      Left Ear: Tympanic membrane, ear canal and external ear normal.      Nose: Nose normal.      Mouth/Throat:      Mouth: Mucous membranes are moist.      Pharynx: Oropharynx is clear.   Cardiovascular:      Rate and Rhythm: Normal rate and regular rhythm.      Pulses: Normal pulses.      Heart sounds: Normal heart sounds.   Pulmonary:      Effort: Pulmonary effort is normal.      Breath sounds: Normal breath sounds.   Musculoskeletal:      Cervical back: Normal range of motion and neck supple.      Comments: Some bruising at the second and third toe of the right foot.  Pain at the base of the toe into the metatarsal.  Pain with lateral medial compression of the foot.   Neurological:      Mental Status: She is alert.   Psychiatric:         Mood and Affect: Mood normal.         Behavior: Behavior normal.         Assessment/Plan   Problem List Items Addressed This Visit  "            ICD-10-CM    Chronic renal impairment, stage 3 (moderate) (Multi) N18.30    Relevant Orders    Follow Up In Primary Care - Established    Hyperlipemia E78.5    Relevant Orders    Follow Up In Primary Care - Established    Comprehensive Metabolic Panel    Cholesterol, LDL Direct    Hypertension - Primary I10     Blood pressure improved, renal function stable, potassium stable, patient is complaining of persistent dizziness, not sure if this is related to blood pressure medication or not.  She has had chronic dizziness.         Relevant Orders    Follow Up In Primary Care - Established    Comprehensive Metabolic Panel    Hypothyroid E03.9    Relevant Orders    Follow Up In Primary Care - Established    Moderate dementia F03.B0    Relevant Orders    Follow Up In Primary Care - Established     Other Visit Diagnoses         Codes    Chronic rhinitis     J31.0    Relevant Orders    Follow Up In Primary Care - Established    Osteoarthritis, unspecified osteoarthritis type, unspecified site     M19.90    Been having more trouble with mobility, will consult home health services for PT and nurse.  Given information for care patrol     Relevant Orders    Follow Up In Primary Care - Established    Right foot pain     M79.671    Check x-ray of the foot.     Relevant Orders    XR foot right 1-2 views    Osteoarthritis of spine with radiculopathy, lumbosacral region     M47.27             Patient was identified as a fall risk. Risk prevention instructions provided.

## 2024-12-11 NOTE — PATIENT INSTRUCTIONS

## 2024-12-12 ENCOUNTER — DOCUMENTATION (OUTPATIENT)
Dept: HOME HEALTH SERVICES | Facility: HOME HEALTH | Age: 89
End: 2024-12-12
Payer: MEDICARE

## 2024-12-12 ENCOUNTER — HOME HEALTH ADMISSION (OUTPATIENT)
Dept: HOME HEALTH SERVICES | Facility: HOME HEALTH | Age: 89
End: 2024-12-12
Payer: MEDICARE

## 2024-12-12 RX ORDER — QUETIAPINE FUMARATE 25 MG/1
12.5 TABLET, FILM COATED ORAL NIGHTLY
Qty: 15 TABLET | Refills: 5 | Status: SHIPPED | OUTPATIENT
Start: 2024-12-12 | End: 2025-06-10

## 2024-12-12 NOTE — TELEPHONE ENCOUNTER
We can try adding Namenda to her existing Aricept.  If she is having issues with increasing irritability, agitation and possible hallucinations in the evening, we can add a low-dose of a medication in the evening to help with that.

## 2024-12-12 NOTE — HH CARE COORDINATION
Home Care received a Referral for Nursing and Physical Therapy. We have processed the referral for a Start of Care on 12-14-24, 24-48 hours.     If you have any questions or concerns, please feel free to contact us at [phone]. Follow the prompts, enter your five digit zip code, and you will be directed to your care team on WEST 1.

## 2024-12-12 NOTE — TELEPHONE ENCOUNTER
Referral added, spoke with Estefani and let her know meds were sent to [facility] and asked which Home Health they would prefer.     Also informed her that xray results came back normal per Dr. Batres.

## 2024-12-12 NOTE — TELEPHONE ENCOUNTER
Spoke with Estefani about possibly adding the meds, states that pt thinks people are coming into the house, and thinks money is missing but it is been proven that it is not. She thinks someone is moving her keys and such. Estefani asked if we can we send medication over about Tuesday so estefani can prepare pt and it wont just be delivered to the house.

## 2024-12-12 NOTE — TELEPHONE ENCOUNTER
Lets try doing half of a tablet of Seroquel with evening meal, prescription was sent to [facility] pharmacy.

## 2024-12-12 NOTE — TELEPHONE ENCOUNTER
Estefani left message, states that pt's Alzheimer's is getting worse and she didn't want to upset mother at the appointment yesterday. She would like to know if there is anything they can do to help pt?     Phone #: [phone] (home)   [phone] (cell)    Xelrojasz Pregnancy And Lactation Text: This medication is Pregnancy Category D and is not considered safe during pregnancy.  The risk during breast feeding is also uncertain.

## 2024-12-13 ENCOUNTER — TELEPHONE (OUTPATIENT)
Age: 89
End: 2024-12-13
Payer: MEDICARE

## 2024-12-13 DIAGNOSIS — F01.B2 MODERATE VASCULAR DEMENTIA WITH PSYCHOTIC DISTURBANCE: Primary | ICD-10-CM

## 2024-12-13 RX ORDER — LORAZEPAM 0.5 MG/1
0.5 TABLET ORAL EVERY 8 HOURS PRN
Qty: 21 TABLET | Refills: 0 | Status: SHIPPED | OUTPATIENT
Start: 2024-12-13 | End: 2024-12-20

## 2024-12-13 NOTE — TELEPHONE ENCOUNTER
Pt DTR called stating that Pt is very upset stating that the kids changed her locks and are taking her meds from her. Pt DTR asking for something stronger so they can get her in a home so she can be safer. Please advise

## 2024-12-13 NOTE — TELEPHONE ENCOUNTER
Be sure to use the quetiapine or Seroquel that we just prescribed, they can use a whole tablet instead of a half, they can also use this twice a day if needed.  I will send in a small prescription of lorazepam that they can use as needed if she gets very upset.

## 2024-12-17 ENCOUNTER — HOME CARE VISIT (OUTPATIENT)
Dept: HOME HEALTH SERVICES | Facility: HOME HEALTH | Age: 89
End: 2024-12-17
Payer: MEDICARE

## 2024-12-17 VITALS
SYSTOLIC BLOOD PRESSURE: 144 MMHG | BODY MASS INDEX: 23.99 KG/M2 | WEIGHT: 119 LBS | HEART RATE: 78 BPM | OXYGEN SATURATION: 98 % | DIASTOLIC BLOOD PRESSURE: 66 MMHG | RESPIRATION RATE: 16 BRPM | HEIGHT: 59 IN

## 2024-12-17 PROCEDURE — G0299 HHS/HOSPICE OF RN EA 15 MIN: HCPCS | Mod: HHH

## 2024-12-17 PROCEDURE — 169592 NO-PAY CLAIM PROCEDURE

## 2024-12-19 ENCOUNTER — HOME CARE VISIT (OUTPATIENT)
Dept: HOME HEALTH SERVICES | Facility: HOME HEALTH | Age: 89
End: 2024-12-19
Payer: MEDICARE

## 2024-12-19 ASSESSMENT — ENCOUNTER SYMPTOMS
DESCRIPTION OF MEMORY LOSS: SHORT TERM
LIMITED RANGE OF MOTION: 1
CHANGE IN APPETITE: VARYING
ORTHOSTATIC HYPOTENSION: 1
OCCASIONAL FEELINGS OF UNSTEADINESS: 1
APPETITE LEVEL: POOR
FATIGUES EASILY: 1
DENIES PAIN: 1
MUSCLE WEAKNESS: 1
STOOL FREQUENCY: DAILY

## 2024-12-19 ASSESSMENT — ACTIVITIES OF DAILY LIVING (ADL)
AMBULATION ASSISTANCE: ONE PERSON
AMBULATION ASSISTANCE: STAND BY ASSIST
OASIS_M1830: 03
CURRENT_FUNCTION: ONE PERSON
ENTERING_EXITING_HOME: STAND BY ASSIST
CURRENT_FUNCTION: STAND BY ASSIST

## 2024-12-21 ENCOUNTER — HOME CARE VISIT (OUTPATIENT)
Dept: HOME HEALTH SERVICES | Facility: HOME HEALTH | Age: 89
End: 2024-12-21
Payer: MEDICARE

## 2024-12-21 PROCEDURE — G0151 HHCP-SERV OF PT,EA 15 MIN: HCPCS | Mod: HHH

## 2024-12-21 SDOH — HEALTH STABILITY: PHYSICAL HEALTH: EXERCISE TYPE: SEATED

## 2024-12-21 ASSESSMENT — ACTIVITIES OF DAILY LIVING (ADL)
TRANSPORTATION: DEPENDENT
LAUNDRY ASSESSED: 1
LIGHT HOUSEKEEPING: NEEDS ASSISTANCE
TRANSPORTATION ASSESSED: 1
AMBULATION ASSISTANCE: 1
AMBULATION ASSISTANCE: ONE PERSON
CURRENT_FUNCTION: ONE PERSON
PHYSICAL TRANSFERS ASSESSED: 1
SHOPPING: DEPENDENT
LAUNDRY: NEEDS ASSISTANCE
AMBULATION_DISTANCE/DURATION_TOLERATED: 60
AMBULATION ASSISTANCE ON FLAT SURFACES: 1
HOUSEKEEPING ASSESSED: 1
PREPARING MEALS: NEEDS ASSISTANCE
SHOPPING ASSESSED: 1

## 2024-12-21 ASSESSMENT — PAIN SCALES - PAIN ASSESSMENT IN ADVANCED DEMENTIA (PAINAD)
TOTALSCORE: 0
NEGVOCALIZATION: 0
FACIALEXPRESSION: 0
BODYLANGUAGE: 0
NEGVOCALIZATION: 0 - NONE.
FACIALEXPRESSION: 0 - SMILING OR INEXPRESSIVE.
BREATHING: 0
CONSOLABILITY: 0
CONSOLABILITY: 0 - NO NEED TO CONSOLE.
BODYLANGUAGE: 0 - RELAXED.

## 2024-12-21 ASSESSMENT — ENCOUNTER SYMPTOMS
HIGHEST PAIN SEVERITY IN PAST 24 HOURS: 0/10
OCCASIONAL FEELINGS OF UNSTEADINESS: 1
DENIES PAIN: 1
LOSS OF SENSATION IN FEET: 1
LOWEST PAIN SEVERITY IN PAST 24 HOURS: 0/10
MUSCLE WEAKNESS: 1
SUBJECTIVE PAIN PROGRESSION: UNCHANGED
PAIN SEVERITY GOAL: 0/10
DEPRESSION: 1

## 2024-12-26 ENCOUNTER — HOME CARE VISIT (OUTPATIENT)
Dept: HOME HEALTH SERVICES | Facility: HOME HEALTH | Age: 89
End: 2024-12-26
Payer: MEDICARE

## 2024-12-26 VITALS
HEART RATE: 68 BPM | DIASTOLIC BLOOD PRESSURE: 70 MMHG | SYSTOLIC BLOOD PRESSURE: 140 MMHG | TEMPERATURE: 98.1 F | OXYGEN SATURATION: 100 % | RESPIRATION RATE: 18 BRPM

## 2024-12-26 PROCEDURE — G0299 HHS/HOSPICE OF RN EA 15 MIN: HCPCS | Mod: HHH

## 2024-12-26 ASSESSMENT — ENCOUNTER SYMPTOMS
LIMITED RANGE OF MOTION: 1
FORGETFULNESS: 1
MUSCLE WEAKNESS: 1
OCCASIONAL FEELINGS OF UNSTEADINESS: 0
APPETITE LEVEL: GOOD

## 2024-12-27 ENCOUNTER — HOME CARE VISIT (OUTPATIENT)
Dept: HOME HEALTH SERVICES | Facility: HOME HEALTH | Age: 89
End: 2024-12-27
Payer: MEDICARE

## 2024-12-27 PROCEDURE — G0157 HHC PT ASSISTANT EA 15: HCPCS | Mod: CQ,HHH

## 2024-12-27 SDOH — HEALTH STABILITY: PHYSICAL HEALTH: EXERCISE TYPE: SEATED

## 2024-12-27 SDOH — HEALTH STABILITY: PHYSICAL HEALTH
EXERCISE COMMENTS: PT COMPLETED SEATED THER EX X10 EA:  ANKLE PUMPS     MARCHING           LAQS                HIP ABD/ADD     HS CURLS            HIP ROT

## 2024-12-27 ASSESSMENT — ACTIVITIES OF DAILY LIVING (ADL)
AMBULATION ASSISTANCE: 1
PHYSICAL TRANSFERS ASSESSED: 1
AMBULATION_DISTANCE/DURATION_TOLERATED: X~60'
CURRENT_FUNCTION: SUPERVISION
AMBULATION ASSISTANCE ON FLAT SURFACES: 1
AMBULATION ASSISTANCE: SUPERVISION

## 2024-12-27 ASSESSMENT — ENCOUNTER SYMPTOMS
DEPRESSION: 1
HIGHEST PAIN SEVERITY IN PAST 24 HOURS: 10/10
PAIN SEVERITY GOAL: 0/10
PAIN LOCATION - PAIN QUALITY: DULL
SUBJECTIVE PAIN PROGRESSION: WAXING AND WANING
LOSS OF SENSATION IN FEET: 0
PAIN LOCATION - PAIN SEVERITY: 10/10
MUSCLE WEAKNESS: 1
PAIN LOCATION - RELIEVING FACTORS: REST
PAIN LOCATION: RIGHT FOOT
PERSON REPORTING PAIN: PATIENT
PAIN LOCATION - PAIN FREQUENCY: WITH ACTIVITY
LOWEST PAIN SEVERITY IN PAST 24 HOURS: 0/10
PAIN LOCATION - EXACERBATING FACTORS: AMB
PAIN: 1
OCCASIONAL FEELINGS OF UNSTEADINESS: 1

## 2024-12-31 ENCOUNTER — HOME CARE VISIT (OUTPATIENT)
Dept: HOME HEALTH SERVICES | Facility: HOME HEALTH | Age: 89
End: 2024-12-31
Payer: MEDICARE

## 2024-12-31 PROCEDURE — G0151 HHCP-SERV OF PT,EA 15 MIN: HCPCS | Mod: HHH

## 2025-01-01 SDOH — HEALTH STABILITY: PHYSICAL HEALTH: EXERCISE TYPE: SEATED

## 2025-01-01 ASSESSMENT — ENCOUNTER SYMPTOMS
PAIN SEVERITY GOAL: 0/10
DENIES PAIN: 1
HIGHEST PAIN SEVERITY IN PAST 24 HOURS: 0/10
LOWEST PAIN SEVERITY IN PAST 24 HOURS: 0/10

## 2025-01-01 ASSESSMENT — PAIN SCALES - PAIN ASSESSMENT IN ADVANCED DEMENTIA (PAINAD)
FACIALEXPRESSION: 0 - SMILING OR INEXPRESSIVE.
BODYLANGUAGE: 0 - RELAXED.
FACIALEXPRESSION: 0
NEGVOCALIZATION: 0
BODYLANGUAGE: 0
BREATHING: 0
TOTALSCORE: 0
CONSOLABILITY: 0
NEGVOCALIZATION: 0 - NONE.
CONSOLABILITY: 0 - NO NEED TO CONSOLE.

## 2025-01-01 ASSESSMENT — ACTIVITIES OF DAILY LIVING (ADL): AMBULATION ASSISTANCE ON FLAT SURFACES: 1

## 2025-01-02 ENCOUNTER — TELEPHONE (OUTPATIENT)
Age: OVER 89
End: 2025-01-02

## 2025-01-02 ENCOUNTER — HOME CARE VISIT (OUTPATIENT)
Dept: HOME HEALTH SERVICES | Facility: HOME HEALTH | Age: OVER 89
End: 2025-01-02
Payer: MEDICARE

## 2025-01-02 VITALS — RESPIRATION RATE: 18 BRPM | HEART RATE: 68 BPM | SYSTOLIC BLOOD PRESSURE: 185 MMHG | DIASTOLIC BLOOD PRESSURE: 79 MMHG

## 2025-01-02 PROCEDURE — G0151 HHCP-SERV OF PT,EA 15 MIN: HCPCS | Mod: HHH

## 2025-01-02 SDOH — HEALTH STABILITY: PHYSICAL HEALTH: EXERCISE TYPE: SEATED EXERCISES

## 2025-01-02 ASSESSMENT — PAIN SCALES - PAIN ASSESSMENT IN ADVANCED DEMENTIA (PAINAD)
BODYLANGUAGE: 0
CONSOLABILITY: 0 - NO NEED TO CONSOLE.
CONSOLABILITY: 0
NEGVOCALIZATION: 0 - NONE.
NEGVOCALIZATION: 0
BODYLANGUAGE: 0 - RELAXED.
FACIALEXPRESSION: 0 - SMILING OR INEXPRESSIVE.
FACIALEXPRESSION: 0
TOTALSCORE: 0
BREATHING: 0

## 2025-01-02 ASSESSMENT — ENCOUNTER SYMPTOMS
MUSCLE WEAKNESS: 1
PAIN SEVERITY GOAL: 0/10
HIGHEST PAIN SEVERITY IN PAST 24 HOURS: 0/10
LOWEST PAIN SEVERITY IN PAST 24 HOURS: 0/10
DENIES PAIN: 1

## 2025-01-02 ASSESSMENT — ACTIVITIES OF DAILY LIVING (ADL)
AMBULATION ASSISTANCE ON FLAT SURFACES: 1
AMBULATION_DISTANCE/DURATION_TOLERATED: 80 FEET X 2
CURRENT_FUNCTION: ONE PERSON
AMBULATION ASSISTANCE: ONE PERSON

## 2025-01-02 NOTE — Clinical Note
January 2, 2025     Josi Mcneill    Patient: Josi Mcneill   YOB: 1924   Date of Visit: 1/2/2025       Dear Dr. Josi Mcneill:    Thank you for referring Josi Mcneill to me for evaluation. Below are my notes for this consultation.  If you have questions, please do not hesitate to call me. I look forward to following your patient along with you.       Sincerely,     Anila Leon MA      CC: No Recipients  ______________________________________________________________________________________

## 2025-01-02 NOTE — LETTER
Dr. Nieves aware about connection issue.      January 2, 2025    Josi Mcneill  [address]  [place] [address]      To whom it may concern:    The above listed patient is normally seen in our practice, she has medical issues that put her at high risk for falling.  She also has mobility issues related to her chronic health issues, please deliver her mail directly to her..    Sincerely,  Luis Batres MD

## 2025-01-03 ENCOUNTER — HOME CARE VISIT (OUTPATIENT)
Dept: HOME HEALTH SERVICES | Facility: HOME HEALTH | Age: OVER 89
End: 2025-01-03
Payer: MEDICARE

## 2025-01-03 VITALS
RESPIRATION RATE: 18 BRPM | SYSTOLIC BLOOD PRESSURE: 120 MMHG | OXYGEN SATURATION: 97 % | HEART RATE: 76 BPM | DIASTOLIC BLOOD PRESSURE: 60 MMHG | TEMPERATURE: 97.6 F

## 2025-01-03 PROCEDURE — G0299 HHS/HOSPICE OF RN EA 15 MIN: HCPCS | Mod: HHH

## 2025-01-04 ASSESSMENT — ENCOUNTER SYMPTOMS
DESCRIPTION OF MEMORY LOSS: SHORT TERM
PAIN LOCATION - PAIN FREQUENCY: INTERMITTENT
PAIN: 1
CHANGE IN APPETITE: UNCHANGED
SUBJECTIVE PAIN PROGRESSION: GRADUALLY IMPROVING
APPETITE LEVEL: FAIR
LAST BOWEL MOVEMENT: 67208
PERSON REPORTING PAIN: PATIENT
PAIN LOCATION - PAIN FREQUENCY: INTERMITTENT
LOWEST PAIN SEVERITY IN PAST 24 HOURS: 0/10
HIGHEST PAIN SEVERITY IN PAST 24 HOURS: 4/10
PAIN LOCATION - EXACERBATING FACTORS: ACTIVITY
PAIN LOCATION - PAIN SEVERITY: 2/10
PAIN LOCATION: LEFT LEG
MUSCLE WEAKNESS: 1
PAIN LOCATION - PAIN QUALITY: TENDER AND ACHY
PAIN LOCATION - PAIN QUALITY: TENDER
LOWER EXTREMITY EDEMA: 1
BOWEL PATTERN NORMAL: 1
STOOL FREQUENCY: DAILY
PAIN LOCATION - PAIN SEVERITY: 2/10
FORGETFULNESS: 1
PAIN LOCATION - RELIEVING FACTORS: REST
DESCRIPTION OF MEMORY LOSS: LONG TERM
PAIN LOCATION: RIGHT FOOT
PAIN LOCATION - EXACERBATING FACTORS: ACTIVITY
PAIN SEVERITY GOAL: 0/10
PAIN LOCATION - RELIEVING FACTORS: REST

## 2025-01-04 ASSESSMENT — ACTIVITIES OF DAILY LIVING (ADL)
PHYSICAL TRANSFERS ASSESSED: 1
TRANSPORTATION ASSESSED: 1
CURRENT_FUNCTION: STAND BY ASSIST
HOUSEKEEPING ASSESSED: 1
PHYSICAL_TRANSFERS_DEVICES: ROLLATOR
LIGHT HOUSEKEEPING: DEPENDENT
BATHING ASSESSED: 1
TRANSPORTATION: DEPENDENT
AMBULATION ASSISTANCE: STAND BY ASSIST
AMBULATION ASSISTANCE: 1
BATHING EQUIPMENT USED: BATH BENCH
PREPARING MEALS: DEPENDENT
BATHING_CURRENT_FUNCTION: CONTACT GUARD ASSIST

## 2025-01-07 ENCOUNTER — HOME CARE VISIT (OUTPATIENT)
Dept: HOME HEALTH SERVICES | Facility: HOME HEALTH | Age: OVER 89
End: 2025-01-07
Payer: MEDICARE

## 2025-01-07 VITALS
RESPIRATION RATE: 18 BRPM | DIASTOLIC BLOOD PRESSURE: 84 MMHG | SYSTOLIC BLOOD PRESSURE: 162 MMHG | HEART RATE: 70 BPM | OXYGEN SATURATION: 98 %

## 2025-01-07 PROCEDURE — G0151 HHCP-SERV OF PT,EA 15 MIN: HCPCS | Mod: HHH

## 2025-01-07 SDOH — HEALTH STABILITY: PHYSICAL HEALTH: EXERCISE TYPE: SEATED

## 2025-01-07 ASSESSMENT — PAIN SCALES - PAIN ASSESSMENT IN ADVANCED DEMENTIA (PAINAD)
NEGVOCALIZATION: 0 - NONE.
NEGVOCALIZATION: 0
FACIALEXPRESSION: 0
BODYLANGUAGE: 0 - RELAXED.
CONSOLABILITY: 0 - NO NEED TO CONSOLE.
TOTALSCORE: 0
BREATHING: 0
BODYLANGUAGE: 0
FACIALEXPRESSION: 0 - SMILING OR INEXPRESSIVE.
CONSOLABILITY: 0

## 2025-01-07 ASSESSMENT — ACTIVITIES OF DAILY LIVING (ADL)
CURRENT_FUNCTION: ONE PERSON
AMBULATION ASSISTANCE: ONE PERSON
AMBULATION ASSISTANCE ON FLAT SURFACES: 1

## 2025-01-07 ASSESSMENT — ENCOUNTER SYMPTOMS
DENIES PAIN: 1
LOWEST PAIN SEVERITY IN PAST 24 HOURS: 0/10
PAIN SEVERITY GOAL: 0/10
MUSCLE WEAKNESS: 1
HIGHEST PAIN SEVERITY IN PAST 24 HOURS: 0/10
PERSON REPORTING PAIN: PATIENT

## 2025-01-08 ENCOUNTER — HOME CARE VISIT (OUTPATIENT)
Dept: HOME HEALTH SERVICES | Facility: HOME HEALTH | Age: OVER 89
End: 2025-01-08
Payer: MEDICARE

## 2025-01-08 VITALS
OXYGEN SATURATION: 96 % | RESPIRATION RATE: 16 BRPM | SYSTOLIC BLOOD PRESSURE: 134 MMHG | DIASTOLIC BLOOD PRESSURE: 78 MMHG | TEMPERATURE: 97.4 F | HEART RATE: 72 BPM

## 2025-01-08 PROCEDURE — G0299 HHS/HOSPICE OF RN EA 15 MIN: HCPCS | Mod: HHH

## 2025-01-09 ENCOUNTER — HOME CARE VISIT (OUTPATIENT)
Dept: HOME HEALTH SERVICES | Facility: HOME HEALTH | Age: OVER 89
End: 2025-01-09
Payer: MEDICARE

## 2025-01-09 VITALS — RESPIRATION RATE: 18 BRPM | HEART RATE: 70 BPM | SYSTOLIC BLOOD PRESSURE: 159 MMHG | DIASTOLIC BLOOD PRESSURE: 69 MMHG

## 2025-01-09 PROCEDURE — G0151 HHCP-SERV OF PT,EA 15 MIN: HCPCS | Mod: HHH

## 2025-01-09 SDOH — HEALTH STABILITY: PHYSICAL HEALTH: EXERCISE TYPE: SEATED

## 2025-01-09 ASSESSMENT — PAIN SCALES - PAIN ASSESSMENT IN ADVANCED DEMENTIA (PAINAD)
CONSOLABILITY: 0
CONSOLABILITY: 0 - NO NEED TO CONSOLE.
FACIALEXPRESSION: 0
FACIALEXPRESSION: 0 - SMILING OR INEXPRESSIVE.
TOTALSCORE: 0
NEGVOCALIZATION: 0
BODYLANGUAGE: 0 - RELAXED.
NEGVOCALIZATION: 0 - NONE.
BODYLANGUAGE: 0
BREATHING: 0

## 2025-01-09 ASSESSMENT — ACTIVITIES OF DAILY LIVING (ADL)
AMBULATION ASSISTANCE: STAND BY ASSIST
AMBULATION ASSISTANCE: 1
AMBULATION ASSISTANCE ON FLAT SURFACES: 1
AMBULATION ASSISTANCE: MINIMUM ASSIST
CURRENT_FUNCTION: ONE PERSON
PHYSICAL TRANSFERS ASSESSED: 1
CURRENT_FUNCTION: STAND BY ASSIST
CURRENT_FUNCTION: MINIMUM ASSIST

## 2025-01-09 ASSESSMENT — ENCOUNTER SYMPTOMS
PAIN LOCATION - PAIN FREQUENCY: CONSTANT
PAIN LOCATION - PAIN DURATION: DAILY
PAIN LOCATION: BACK
DESCRIPTION OF MEMORY LOSS: SHORT TERM
LOWEST PAIN SEVERITY IN PAST 24 HOURS: 0/10
PAIN SEVERITY GOAL: 0/10
MUSCLE WEAKNESS: 1
MUSCLE WEAKNESS: 1
CHANGE IN APPETITE: UNCHANGED
PERSON REPORTING PAIN: PATIENT
PAIN LOCATION - RELIEVING FACTORS: REST
PAIN LOCATION - PAIN SEVERITY: 1/10
PAIN: 1
HIGHEST PAIN SEVERITY IN PAST 24 HOURS: 1/10
PAIN LOCATION - PAIN QUALITY: ACHY
APPETITE LEVEL: FAIR
PAIN LOCATION - EXACERBATING FACTORS: MOVEMENT
LIMITED RANGE OF MOTION: 1

## 2025-01-14 ENCOUNTER — HOME CARE VISIT (OUTPATIENT)
Dept: HOME HEALTH SERVICES | Facility: HOME HEALTH | Age: OVER 89
End: 2025-01-14
Payer: MEDICARE

## 2025-01-14 VITALS
DIASTOLIC BLOOD PRESSURE: 77 MMHG | HEART RATE: 69 BPM | OXYGEN SATURATION: 98 % | SYSTOLIC BLOOD PRESSURE: 149 MMHG | RESPIRATION RATE: 18 BRPM

## 2025-01-14 PROCEDURE — G0151 HHCP-SERV OF PT,EA 15 MIN: HCPCS | Mod: HHH

## 2025-01-14 SDOH — HEALTH STABILITY: PHYSICAL HEALTH: EXERCISE TYPE: SEATED

## 2025-01-14 ASSESSMENT — PAIN SCALES - PAIN ASSESSMENT IN ADVANCED DEMENTIA (PAINAD)
NEGVOCALIZATION: 0
BODYLANGUAGE: 0
BREATHING: 0
FACIALEXPRESSION: 0 - SMILING OR INEXPRESSIVE.
TOTALSCORE: 0
CONSOLABILITY: 0 - NO NEED TO CONSOLE.
FACIALEXPRESSION: 0
NEGVOCALIZATION: 0 - NONE.
CONSOLABILITY: 0
BODYLANGUAGE: 0 - RELAXED.

## 2025-01-14 ASSESSMENT — ENCOUNTER SYMPTOMS
MUSCLE WEAKNESS: 1
DENIES PAIN: 1
PAIN SEVERITY GOAL: 0/10
HIGHEST PAIN SEVERITY IN PAST 24 HOURS: 0/10
LOWEST PAIN SEVERITY IN PAST 24 HOURS: 0/10
SUBJECTIVE PAIN PROGRESSION: UNCHANGED

## 2025-01-14 ASSESSMENT — ACTIVITIES OF DAILY LIVING (ADL)
AMBULATION ASSISTANCE: ONE PERSON
CURRENT_FUNCTION: ONE PERSON
AMBULATION ASSISTANCE ON FLAT SURFACES: 1

## 2025-01-15 ENCOUNTER — HOME CARE VISIT (OUTPATIENT)
Dept: HOME HEALTH SERVICES | Facility: HOME HEALTH | Age: OVER 89
End: 2025-01-15
Payer: MEDICARE

## 2025-01-15 PROCEDURE — G0299 HHS/HOSPICE OF RN EA 15 MIN: HCPCS | Mod: HHH

## 2025-01-16 ENCOUNTER — HOME CARE VISIT (OUTPATIENT)
Dept: HOME HEALTH SERVICES | Facility: HOME HEALTH | Age: OVER 89
End: 2025-01-16
Payer: MEDICARE

## 2025-01-16 VITALS — DIASTOLIC BLOOD PRESSURE: 84 MMHG | HEART RATE: 70 BPM | RESPIRATION RATE: 18 BRPM | SYSTOLIC BLOOD PRESSURE: 173 MMHG

## 2025-01-16 PROCEDURE — G0151 HHCP-SERV OF PT,EA 15 MIN: HCPCS | Mod: HHH

## 2025-01-16 SDOH — HEALTH STABILITY: PHYSICAL HEALTH: EXERCISE TYPE: SEATED

## 2025-01-16 ASSESSMENT — ACTIVITIES OF DAILY LIVING (ADL)
LIGHT HOUSEKEEPING: NEEDS ASSISTANCE
TRANSPORTATION: NEEDS ASSISTANCE
TRANSPORTATION ASSESSED: 1
SHOPPING: NEEDS ASSISTANCE
LAUNDRY ASSESSED: 1
HOUSEKEEPING ASSESSED: 1
CURRENT_FUNCTION: MINIMUM ASSIST
AMBULATION ASSISTANCE ON FLAT SURFACES: 1
PHYSICAL TRANSFERS ASSESSED: 1
LAUNDRY: NEEDS ASSISTANCE
SHOPPING ASSESSED: 1
CURRENT_FUNCTION: ONE PERSON

## 2025-01-16 ASSESSMENT — PAIN SCALES - PAIN ASSESSMENT IN ADVANCED DEMENTIA (PAINAD)
BREATHING: 0
TOTALSCORE: 0
CONSOLABILITY: 0 - NO NEED TO CONSOLE.
NEGVOCALIZATION: 0 - NONE.
FACIALEXPRESSION: 0 - SMILING OR INEXPRESSIVE.
BODYLANGUAGE: 0
CONSOLABILITY: 0
FACIALEXPRESSION: 0
NEGVOCALIZATION: 0
BODYLANGUAGE: 0 - RELAXED.

## 2025-01-16 ASSESSMENT — ENCOUNTER SYMPTOMS
DEPRESSION: 1
DENIES PAIN: 1
PERSON REPORTING PAIN: PATIENT
MUSCLE WEAKNESS: 1
PAIN SEVERITY GOAL: 0/10
HIGHEST PAIN SEVERITY IN PAST 24 HOURS: 0/10
LOWEST PAIN SEVERITY IN PAST 24 HOURS: 0/10
LOSS OF SENSATION IN FEET: 1
OCCASIONAL FEELINGS OF UNSTEADINESS: 1

## 2025-01-17 VITALS
OXYGEN SATURATION: 99 % | RESPIRATION RATE: 16 BRPM | HEART RATE: 62 BPM | DIASTOLIC BLOOD PRESSURE: 62 MMHG | SYSTOLIC BLOOD PRESSURE: 110 MMHG

## 2025-01-17 ASSESSMENT — ENCOUNTER SYMPTOMS
DESCRIPTION OF MEMORY LOSS: SHORT TERM
APPETITE LEVEL: GOOD
CHANGE IN APPETITE: UNCHANGED
LIMITED RANGE OF MOTION: 1

## 2025-01-21 ENCOUNTER — HOME CARE VISIT (OUTPATIENT)
Dept: HOME HEALTH SERVICES | Facility: HOME HEALTH | Age: OVER 89
End: 2025-01-21
Payer: MEDICARE

## 2025-01-21 VITALS — HEART RATE: 65 BPM | DIASTOLIC BLOOD PRESSURE: 64 MMHG | RESPIRATION RATE: 18 BRPM | SYSTOLIC BLOOD PRESSURE: 124 MMHG

## 2025-01-21 PROCEDURE — G0151 HHCP-SERV OF PT,EA 15 MIN: HCPCS | Mod: HHH

## 2025-01-21 SDOH — HEALTH STABILITY: PHYSICAL HEALTH: EXERCISE TYPE: SEATED

## 2025-01-21 ASSESSMENT — PAIN SCALES - PAIN ASSESSMENT IN ADVANCED DEMENTIA (PAINAD)
BREATHING: 0
NEGVOCALIZATION: 0
FACIALEXPRESSION: 0 - SMILING OR INEXPRESSIVE.
BODYLANGUAGE: 0
BODYLANGUAGE: 0 - RELAXED.
CONSOLABILITY: 0 - NO NEED TO CONSOLE.
NEGVOCALIZATION: 0 - NONE.
CONSOLABILITY: 0
TOTALSCORE: 0
FACIALEXPRESSION: 0

## 2025-01-21 ASSESSMENT — ENCOUNTER SYMPTOMS
HIGHEST PAIN SEVERITY IN PAST 24 HOURS: 0/10
PERSON REPORTING PAIN: PATIENT
DENIES PAIN: 1
PAIN SEVERITY GOAL: 0/10
LOWEST PAIN SEVERITY IN PAST 24 HOURS: 0/10
MUSCLE WEAKNESS: 1

## 2025-01-21 ASSESSMENT — ACTIVITIES OF DAILY LIVING (ADL)
AMBULATION_DISTANCE/DURATION_TOLERATED: 3 MINUTES
AMBULATION ASSISTANCE ON FLAT SURFACES: 1

## 2025-01-22 ENCOUNTER — HOME CARE VISIT (OUTPATIENT)
Dept: HOME HEALTH SERVICES | Facility: HOME HEALTH | Age: OVER 89
End: 2025-01-22
Payer: MEDICARE

## 2025-01-22 VITALS
OXYGEN SATURATION: 100 % | DIASTOLIC BLOOD PRESSURE: 64 MMHG | SYSTOLIC BLOOD PRESSURE: 154 MMHG | TEMPERATURE: 97.4 F | RESPIRATION RATE: 16 BRPM | HEART RATE: 62 BPM

## 2025-01-22 PROCEDURE — G0299 HHS/HOSPICE OF RN EA 15 MIN: HCPCS | Mod: HHH

## 2025-01-22 ASSESSMENT — ENCOUNTER SYMPTOMS
CHANGE IN APPETITE: UNCHANGED
APPETITE LEVEL: GOOD

## 2025-01-28 ENCOUNTER — HOME CARE VISIT (OUTPATIENT)
Dept: HOME HEALTH SERVICES | Facility: HOME HEALTH | Age: OVER 89
End: 2025-01-28
Payer: MEDICARE

## 2025-01-28 ENCOUNTER — TELEPHONE (OUTPATIENT)
Age: OVER 89
End: 2025-01-28
Payer: MEDICARE

## 2025-01-28 VITALS — SYSTOLIC BLOOD PRESSURE: 166 MMHG | RESPIRATION RATE: 18 BRPM | HEART RATE: 74 BPM | DIASTOLIC BLOOD PRESSURE: 89 MMHG

## 2025-01-28 PROCEDURE — G0151 HHCP-SERV OF PT,EA 15 MIN: HCPCS | Mod: HHH

## 2025-01-28 SDOH — HEALTH STABILITY: PHYSICAL HEALTH: EXERCISE TYPE: STANDING

## 2025-01-28 ASSESSMENT — ACTIVITIES OF DAILY LIVING (ADL)
AMBULATION_DISTANCE/DURATION_TOLERATED: 2 MINUTES
AMBULATION ASSISTANCE ON FLAT SURFACES: 1
AMBULATION ASSISTANCE: ONE PERSON
CURRENT_FUNCTION: ONE PERSON

## 2025-01-28 ASSESSMENT — ENCOUNTER SYMPTOMS
LOWEST PAIN SEVERITY IN PAST 24 HOURS: 0/10
MUSCLE WEAKNESS: 1
HIGHEST PAIN SEVERITY IN PAST 24 HOURS: 0/10
PAIN SEVERITY GOAL: 0/10
PERSON REPORTING PAIN: PATIENT
DENIES PAIN: 1

## 2025-01-28 ASSESSMENT — PAIN SCALES - PAIN ASSESSMENT IN ADVANCED DEMENTIA (PAINAD)
FACIALEXPRESSION: 0
NEGVOCALIZATION: 0 - NONE.
TOTALSCORE: 0
NEGVOCALIZATION: 0
BREATHING: 0
FACIALEXPRESSION: 0 - SMILING OR INEXPRESSIVE.
CONSOLABILITY: 0 - NO NEED TO CONSOLE.
BODYLANGUAGE: 0
CONSOLABILITY: 0
BODYLANGUAGE: 0 - RELAXED.

## 2025-01-28 NOTE — TELEPHONE ENCOUNTER
Pt DTR called and stated that spironolactone was discontinued and Pt's BP was 166/89 today and has been running like this DTR would like to know what you wouldl like to do? Please advise

## 2025-02-06 ENCOUNTER — HOME CARE VISIT (OUTPATIENT)
Dept: HOME HEALTH SERVICES | Facility: HOME HEALTH | Age: OVER 89
End: 2025-02-06
Payer: MEDICARE

## 2025-02-06 VITALS
RESPIRATION RATE: 18 BRPM | HEART RATE: 69 BPM | OXYGEN SATURATION: 96 % | SYSTOLIC BLOOD PRESSURE: 143 MMHG | DIASTOLIC BLOOD PRESSURE: 63 MMHG

## 2025-02-06 PROCEDURE — G0151 HHCP-SERV OF PT,EA 15 MIN: HCPCS | Mod: HHH

## 2025-02-06 SDOH — HEALTH STABILITY: PHYSICAL HEALTH: EXERCISE TYPE: SEATED

## 2025-02-06 ASSESSMENT — PAIN SCALES - PAIN ASSESSMENT IN ADVANCED DEMENTIA (PAINAD)
FACIALEXPRESSION: 0
CONSOLABILITY: 0
FACIALEXPRESSION: 0 - SMILING OR INEXPRESSIVE.
NEGVOCALIZATION: 0
BODYLANGUAGE: 0 - RELAXED.
TOTALSCORE: 0
CONSOLABILITY: 0 - NO NEED TO CONSOLE.
BODYLANGUAGE: 0
BREATHING: 0
NEGVOCALIZATION: 0 - NONE.

## 2025-02-06 ASSESSMENT — ENCOUNTER SYMPTOMS
HIGHEST PAIN SEVERITY IN PAST 24 HOURS: 0/10
PAIN SEVERITY GOAL: 0/10
DENIES PAIN: 1
MUSCLE WEAKNESS: 1
LOWEST PAIN SEVERITY IN PAST 24 HOURS: 0/10
PERSON REPORTING PAIN: PATIENT

## 2025-02-06 ASSESSMENT — ACTIVITIES OF DAILY LIVING (ADL)
CURRENT_FUNCTION: ONE PERSON
AMBULATION ASSISTANCE: ONE PERSON
AMBULATION ASSISTANCE ON FLAT SURFACES: 1
AMBULATION_DISTANCE/DURATION_TOLERATED: 3 MINUTES

## 2025-02-11 ENCOUNTER — HOME CARE VISIT (OUTPATIENT)
Dept: HOME HEALTH SERVICES | Facility: HOME HEALTH | Age: OVER 89
End: 2025-02-11
Payer: MEDICARE

## 2025-02-11 VITALS
SYSTOLIC BLOOD PRESSURE: 129 MMHG | RESPIRATION RATE: 18 BRPM | OXYGEN SATURATION: 95 % | DIASTOLIC BLOOD PRESSURE: 66 MMHG | HEART RATE: 75 BPM

## 2025-02-11 PROCEDURE — G0151 HHCP-SERV OF PT,EA 15 MIN: HCPCS | Mod: HHH

## 2025-02-11 SDOH — HEALTH STABILITY: PHYSICAL HEALTH: EXERCISE TYPE: SEATED

## 2025-02-11 ASSESSMENT — ACTIVITIES OF DAILY LIVING (ADL)
GROOMING_CURRENT_FUNCTION: INDEPENDENT
LAUNDRY: INDEPENDENT
BATHING ASSESSED: 1
PREPARING MEALS: INDEPENDENT
TELEPHONE USE ASSESSED: 1
TOILETING: 1
DRESSING_UB_CURRENT_FUNCTION: INDEPENDENT
DRESSING_LB_CURRENT_FUNCTION: INDEPENDENT
TRANSPORTATION ASSESSED: 1
SHOPPING: DEPENDENT
OASIS_M1830: 00
GROOMING ASSESSED: 1
FEEDING ASSESSED: 1
HOUSEKEEPING ASSESSED: 1
LIGHT HOUSEKEEPING: INDEPENDENT
LAUNDRY ASSESSED: 1
TOILETING: INDEPENDENT
USING THE TELPHONE: INDEPENDENT
AMBULATION ASSISTANCE: INDEPENDENT
AMBULATION ASSISTANCE: 1
PHYSICAL TRANSFERS ASSESSED: 1
SHOPPING ASSESSED: 1
TRANSPORTATION: DEPENDENT
AMBULATION ASSISTANCE ON FLAT SURFACES: 1
FEEDING: INDEPENDENT
CURRENT_FUNCTION: INDEPENDENT
HOME_HEALTH_OASIS: 00
BATHING_CURRENT_FUNCTION: INDEPENDENT

## 2025-02-11 ASSESSMENT — PAIN SCALES - PAIN ASSESSMENT IN ADVANCED DEMENTIA (PAINAD)
BODYLANGUAGE: 0
TOTALSCORE: 0
NEGVOCALIZATION: 0 - NONE.
NEGVOCALIZATION: 0
FACIALEXPRESSION: 0
BREATHING: 0
FACIALEXPRESSION: 0 - SMILING OR INEXPRESSIVE.
CONSOLABILITY: 0
BODYLANGUAGE: 0 - RELAXED.
CONSOLABILITY: 0 - NO NEED TO CONSOLE.

## 2025-02-11 ASSESSMENT — ENCOUNTER SYMPTOMS
DENIES PAIN: 1
DEPRESSION: 0
PERSON REPORTING PAIN: PATIENT
OCCASIONAL FEELINGS OF UNSTEADINESS: 0
LOSS OF SENSATION IN FEET: 0

## 2025-02-27 DIAGNOSIS — E03.9 ACQUIRED HYPOTHYROIDISM: ICD-10-CM

## 2025-03-03 RX ORDER — LEVOTHYROXINE SODIUM 50 UG/1
25 TABLET ORAL DAILY
Qty: 45 TABLET | Refills: 3 | Status: SHIPPED | OUTPATIENT
Start: 2025-03-03 | End: 2026-03-03

## 2025-03-19 LAB
ALBUMIN SERPL-MCNC: 4.7 G/DL (ref 3.6–5.1)
ALP SERPL-CCNC: 96 U/L (ref 37–153)
ALT SERPL-CCNC: 12 U/L (ref 6–29)
ANION GAP SERPL CALCULATED.4IONS-SCNC: 9 MMOL/L (CALC) (ref 7–17)
AST SERPL-CCNC: 18 U/L (ref 10–35)
BILIRUB SERPL-MCNC: 0.6 MG/DL (ref 0.2–1.2)
BUN SERPL-MCNC: 42 MG/DL (ref 7–25)
CALCIUM SERPL-MCNC: 9.3 MG/DL (ref 8.6–10.4)
CHLORIDE SERPL-SCNC: 104 MMOL/L (ref 98–110)
CO2 SERPL-SCNC: 26 MMOL/L (ref 20–32)
CREAT SERPL-MCNC: 1.15 MG/DL (ref 0.6–0.95)
EGFRCR SERPLBLD CKD-EPI 2021: 43 ML/MIN/1.73M2
GLUCOSE SERPL-MCNC: 86 MG/DL (ref 65–99)
LDLC SERPL DIRECT ASSAY-MCNC: 83 MG/DL
POTASSIUM SERPL-SCNC: 4 MMOL/L (ref 3.5–5.3)
PROT SERPL-MCNC: 6.6 G/DL (ref 6.1–8.1)
SODIUM SERPL-SCNC: 139 MMOL/L (ref 135–146)

## 2025-03-25 ENCOUNTER — APPOINTMENT (OUTPATIENT)
Age: OVER 89
End: 2025-03-25
Payer: MEDICARE

## 2025-03-27 ENCOUNTER — OFFICE VISIT (OUTPATIENT)
Age: OVER 89
End: 2025-03-27
Payer: MEDICARE

## 2025-03-27 VITALS
SYSTOLIC BLOOD PRESSURE: 120 MMHG | WEIGHT: 121.8 LBS | DIASTOLIC BLOOD PRESSURE: 90 MMHG | HEIGHT: 59 IN | OXYGEN SATURATION: 98 % | HEART RATE: 67 BPM | BODY MASS INDEX: 24.56 KG/M2

## 2025-03-27 DIAGNOSIS — E78.2 MIXED HYPERLIPIDEMIA: ICD-10-CM

## 2025-03-27 DIAGNOSIS — I10 PRIMARY HYPERTENSION: ICD-10-CM

## 2025-03-27 DIAGNOSIS — F01.B0 MODERATE VASCULAR DEMENTIA WITHOUT BEHAVIORAL DISTURBANCE, PSYCHOTIC DISTURBANCE, MOOD DISTURBANCE, OR ANXIETY: ICD-10-CM

## 2025-03-27 DIAGNOSIS — E03.9 ACQUIRED HYPOTHYROIDISM: ICD-10-CM

## 2025-03-27 DIAGNOSIS — N18.32 CHRONIC RENAL IMPAIRMENT, STAGE 3B (MULTI): ICD-10-CM

## 2025-03-27 DIAGNOSIS — F01.B2 MODERATE VASCULAR DEMENTIA WITH PSYCHOTIC DISTURBANCE: ICD-10-CM

## 2025-03-27 DIAGNOSIS — F03.B0 MODERATE DEMENTIA, UNSPECIFIED DEMENTIA TYPE, UNSPECIFIED WHETHER BEHAVIORAL, PSYCHOTIC, OR MOOD DISTURBANCE OR ANXIETY: ICD-10-CM

## 2025-03-27 PROCEDURE — 3080F DIAST BP >= 90 MM HG: CPT | Performed by: FAMILY MEDICINE

## 2025-03-27 PROCEDURE — 99213 OFFICE O/P EST LOW 20 MIN: CPT | Performed by: FAMILY MEDICINE

## 2025-03-27 PROCEDURE — 1159F MED LIST DOCD IN RCRD: CPT | Performed by: FAMILY MEDICINE

## 2025-03-27 PROCEDURE — G2211 COMPLEX E/M VISIT ADD ON: HCPCS | Performed by: FAMILY MEDICINE

## 2025-03-27 PROCEDURE — 1160F RVW MEDS BY RX/DR IN RCRD: CPT | Performed by: FAMILY MEDICINE

## 2025-03-27 PROCEDURE — 1036F TOBACCO NON-USER: CPT | Performed by: FAMILY MEDICINE

## 2025-03-27 PROCEDURE — 1158F ADVNC CARE PLAN TLK DOCD: CPT | Performed by: FAMILY MEDICINE

## 2025-03-27 PROCEDURE — 1123F ACP DISCUSS/DSCN MKR DOCD: CPT | Performed by: FAMILY MEDICINE

## 2025-03-27 PROCEDURE — 3074F SYST BP LT 130 MM HG: CPT | Performed by: FAMILY MEDICINE

## 2025-03-27 RX ORDER — QUETIAPINE FUMARATE 25 MG/1
12.5 TABLET, FILM COATED ORAL NIGHTLY
Qty: 15 TABLET | Refills: 5 | Status: SHIPPED | OUTPATIENT
Start: 2025-03-27 | End: 2025-09-23

## 2025-03-27 ASSESSMENT — ENCOUNTER SYMPTOMS
ABDOMINAL DISTENTION: 0
TROUBLE SWALLOWING: 0
RHINORRHEA: 0
CONSTIPATION: 0
NAUSEA: 0
COUGH: 0
DYSPHORIC MOOD: 0
UNEXPECTED WEIGHT CHANGE: 0
ABDOMINAL PAIN: 0
HEADACHES: 0
NERVOUS/ANXIOUS: 1
DIARRHEA: 0
WHEEZING: 0
ACTIVITY CHANGE: 0
FATIGUE: 0
PALPITATIONS: 0
LOSS OF SENSATION IN FEET: 0
APPETITE CHANGE: 0
ADENOPATHY: 0
DIZZINESS: 1
SHORTNESS OF BREATH: 0
SLEEP DISTURBANCE: 0
LIGHT-HEADEDNESS: 0
NUMBNESS: 0
OCCASIONAL FEELINGS OF UNSTEADINESS: 1
ARTHRALGIAS: 0
DEPRESSION: 0
DIFFICULTY URINATING: 0
VOMITING: 0

## 2025-03-27 ASSESSMENT — PATIENT HEALTH QUESTIONNAIRE - PHQ9
2. FEELING DOWN, DEPRESSED OR HOPELESS: SEVERAL DAYS
10. IF YOU CHECKED OFF ANY PROBLEMS, HOW DIFFICULT HAVE THESE PROBLEMS MADE IT FOR YOU TO DO YOUR WORK, TAKE CARE OF THINGS AT HOME, OR GET ALONG WITH OTHER PEOPLE: SOMEWHAT DIFFICULT
1. LITTLE INTEREST OR PLEASURE IN DOING THINGS: NOT AT ALL
SUM OF ALL RESPONSES TO PHQ9 QUESTIONS 1 AND 2: 1

## 2025-03-27 NOTE — ASSESSMENT & PLAN NOTE
Stable, lipid profile okay, continue with Lipitor.  SmartLink not supported outside of the Encounter Diagnoses SmartSection.

## 2025-03-27 NOTE — PROGRESS NOTES
"Subjective   Patient ID: Josi Mcneill is a 89 y.o. female who presents for 3 MO LABS.    HPI   No headache, chest pain, shortness of breath, dizziness, lightheadedness, or edema  Chronic RN  Chronic dizziness, had a few falls, no injury, had done some home PT (helped)  Some pain in neck  No stomach or bowel issues, appetite OK, sleeping +/-  Anxious at times    Review of Systems   Constitutional:  Negative for activity change, appetite change, fatigue and unexpected weight change.   HENT:  Negative for ear pain, nosebleeds, rhinorrhea, sneezing and trouble swallowing.    Respiratory:  Negative for cough, shortness of breath and wheezing.    Cardiovascular:  Negative for chest pain, palpitations and leg swelling.   Gastrointestinal:  Negative for abdominal distention, abdominal pain, constipation, diarrhea, nausea and vomiting.   Genitourinary:  Negative for difficulty urinating.   Musculoskeletal:  Positive for gait problem. Negative for arthralgias.   Skin:  Negative for rash.   Neurological:  Positive for dizziness. Negative for light-headedness, numbness and headaches.   Hematological:  Negative for adenopathy.   Psychiatric/Behavioral:  Negative for behavioral problems, dysphoric mood and sleep disturbance. The patient is nervous/anxious.    All other systems reviewed and are negative.      Objective   /90   Pulse 67   Ht 1.499 m (4' 11\")   Wt 55.2 kg (121 lb 12.8 oz)   SpO2 98%   BMI 24.60 kg/m²     Physical Exam  Vitals and nursing note reviewed.   Constitutional:       Appearance: Normal appearance.   HENT:      Head: Normocephalic and atraumatic.      Right Ear: Tympanic membrane, ear canal and external ear normal.      Left Ear: Tympanic membrane, ear canal and external ear normal.      Nose: Nose normal.      Mouth/Throat:      Mouth: Mucous membranes are moist.      Pharynx: Oropharynx is clear.   Cardiovascular:      Rate and Rhythm: Normal rate and regular rhythm.      Pulses: Normal " pulses.      Heart sounds: Normal heart sounds.   Pulmonary:      Effort: Pulmonary effort is normal.      Breath sounds: Normal breath sounds.   Musculoskeletal:      Cervical back: Normal range of motion and neck supple.   Skin:     General: Skin is warm and dry.      Capillary Refill: Capillary refill takes less than 2 seconds.   Neurological:      Mental Status: She is alert.   Psychiatric:         Mood and Affect: Mood normal.         Behavior: Behavior normal.         Assessment/Plan   Problem List Items Addressed This Visit             ICD-10-CM    Chronic renal impairment, stage 3 (moderate) (Multi) N18.30     Renal function stable, no change.  SmartLink not supported outside of the Encounter Diagnoses SmartSection.           Relevant Orders    Follow Up In Primary Care - Established    Hyperlipemia E78.5     Stable, lipid profile okay, continue with Lipitor.  SmartLink not supported outside of the Encounter Diagnoses SmartSection.           Relevant Orders    Follow Up In Primary Care - Established    Comprehensive Metabolic Panel    Lipid Panel    Hypertension I10     Blood pressure actually in very good today, renal function stable no change.         Relevant Orders    Follow Up In Primary Care - Established    Comprehensive Metabolic Panel    Hypothyroid E03.9    Relevant Orders    Follow Up In Primary Care - Established    TSH with reflex to Free T4 if abnormal    Moderate dementia F03.B0     Family very supportive, continue with current medication, quetiapine helps with sundowning syndrome.         Relevant Medications    QUEtiapine (SEROquel) 25 mg tablet    Other Relevant Orders    Follow Up In Primary Care - Established    Follow Up In Primary Care - Established        Patient was identified as a fall risk. Risk prevention instructions provided.

## 2025-03-27 NOTE — ASSESSMENT & PLAN NOTE
Family very supportive, continue with current medication, quetiapine helps with sundowning syndrome.

## 2025-03-27 NOTE — ASSESSMENT & PLAN NOTE
Renal function stable, no change.  SmartLink not supported outside of the Encounter Diagnoses SmartSection.

## 2025-04-14 RX ORDER — DONEPEZIL HYDROCHLORIDE 10 MG/1
TABLET, FILM COATED ORAL
Qty: 90 TABLET | Refills: 0 | OUTPATIENT
Start: 2025-04-14

## 2025-05-29 ENCOUNTER — TELEPHONE (OUTPATIENT)
Age: OVER 89
End: 2025-05-29
Payer: MEDICARE

## 2025-05-29 NOTE — TELEPHONE ENCOUNTER
Pt DTR called stating that Pt had pain in wrist yesterday and this morning she woke up with bruise from hand to elbow. Please advise    Not terrible pain just an irritant feeling  Pt does have arthritis in hand as well

## 2025-06-24 DIAGNOSIS — F03.B0 MODERATE DEMENTIA, UNSPECIFIED DEMENTIA TYPE, UNSPECIFIED WHETHER BEHAVIORAL, PSYCHOTIC, OR MOOD DISTURBANCE OR ANXIETY: ICD-10-CM

## 2025-06-24 RX ORDER — DONEPEZIL HYDROCHLORIDE 10 MG/1
TABLET, FILM COATED ORAL
Qty: 90 TABLET | Refills: 3 | Status: SHIPPED | OUTPATIENT
Start: 2025-06-24

## 2025-07-25 NOTE — ASSESSMENT & PLAN NOTE
Patient with increasing fatigue and weakness, laboratory testing done in the emergency room was reviewed and was within normal limits, will set up for home physical therapy and nurse to try to improve balance and reduce risk for falls.   Body Location Override (Optional - Billing Will Still Be Based On Selected Body Map Location If Applicable): left medial temple Detail Level: Detailed Add 38101 Cpt? (Important Note: In 2017 The Use Of 12009 Is Being Tracked By Cms To Determine Future Global Period Reimbursement For Global Periods): no Suture Removal Completed By (Optional): Dinorah Vickers

## 2025-09-30 ENCOUNTER — APPOINTMENT (OUTPATIENT)
Age: OVER 89
End: 2025-09-30
Payer: MEDICARE